# Patient Record
Sex: FEMALE | Race: WHITE | NOT HISPANIC OR LATINO | Employment: FULL TIME | URBAN - METROPOLITAN AREA
[De-identification: names, ages, dates, MRNs, and addresses within clinical notes are randomized per-mention and may not be internally consistent; named-entity substitution may affect disease eponyms.]

---

## 2021-11-23 ENCOUNTER — CLINICAL SUPPORT (OUTPATIENT)
Dept: INTERNAL MEDICINE | Facility: CLINIC | Age: 41
End: 2021-11-23
Payer: COMMERCIAL

## 2021-11-23 VITALS
DIASTOLIC BLOOD PRESSURE: 70 MMHG | WEIGHT: 250 LBS | BODY MASS INDEX: 41.65 KG/M2 | HEIGHT: 65 IN | OXYGEN SATURATION: 95 % | SYSTOLIC BLOOD PRESSURE: 131 MMHG | HEART RATE: 90 BPM

## 2021-11-23 DIAGNOSIS — Z02.89 ENCOUNTER FOR OCCUPATIONAL PHYSICAL EXAMINATION: Primary | ICD-10-CM

## 2021-11-23 PROCEDURE — 80305 PR NON-DOT DRUG SCREENS: ICD-10-PCS | Mod: ,,, | Performed by: NURSE PRACTITIONER

## 2021-11-23 PROCEDURE — 99499 PR PHYSICAL - BASIC NON DOT/CDL: ICD-10-PCS | Mod: ,,, | Performed by: NURSE PRACTITIONER

## 2021-11-23 PROCEDURE — 99499 UNLISTED E&M SERVICE: CPT | Mod: ,,, | Performed by: NURSE PRACTITIONER

## 2021-11-23 PROCEDURE — 80305 DRUG TEST PRSMV DIR OPT OBS: CPT | Mod: ,,, | Performed by: NURSE PRACTITIONER

## 2021-11-23 RX ORDER — IBUPROFEN 200 MG
200 TABLET ORAL EVERY 6 HOURS PRN
COMMUNITY

## 2022-11-21 ENCOUNTER — TELEPHONE (OUTPATIENT)
Dept: LAB | Facility: CLINIC | Age: 42
End: 2022-11-21
Payer: COMMERCIAL

## 2022-11-21 NOTE — TELEPHONE ENCOUNTER
----- Message from Malini Lawrence sent at 11/21/2022 10:12 AM CST -----  Regarding: schedule  Contact: [phone]  Request Appt      Appt Type: Annual   Call Back Number:[phone]  Additional Information:

## 2022-12-12 ENCOUNTER — OFFICE VISIT (OUTPATIENT)
Dept: FAMILY MEDICINE | Facility: CLINIC | Age: 42
End: 2022-12-12
Payer: COMMERCIAL

## 2022-12-12 VITALS
SYSTOLIC BLOOD PRESSURE: 110 MMHG | DIASTOLIC BLOOD PRESSURE: 68 MMHG | OXYGEN SATURATION: 98 % | BODY MASS INDEX: 48.46 KG/M2 | HEIGHT: 65 IN | HEART RATE: 82 BPM | WEIGHT: 290.88 LBS | TEMPERATURE: 99 F

## 2022-12-12 DIAGNOSIS — Z13.29 SCREENING FOR THYROID DISORDER: ICD-10-CM

## 2022-12-12 DIAGNOSIS — R68.82 DECREASED LIBIDO: ICD-10-CM

## 2022-12-12 DIAGNOSIS — Z80.8 FAMILY HISTORY OF THYROID CANCER: ICD-10-CM

## 2022-12-12 DIAGNOSIS — Z80.3 FAMILY HISTORY OF BREAST CANCER IN FIRST DEGREE RELATIVE: ICD-10-CM

## 2022-12-12 DIAGNOSIS — Z13.220 ENCOUNTER FOR LIPID SCREENING FOR CARDIOVASCULAR DISEASE: ICD-10-CM

## 2022-12-12 DIAGNOSIS — E66.01 CLASS 3 SEVERE OBESITY DUE TO EXCESS CALORIES WITH BODY MASS INDEX (BMI) OF 45.0 TO 49.9 IN ADULT, UNSPECIFIED WHETHER SERIOUS COMORBIDITY PRESENT: ICD-10-CM

## 2022-12-12 DIAGNOSIS — Z13.1 ENCOUNTER FOR SCREENING EXAMINATION FOR IMPAIRED GLUCOSE REGULATION AND DIABETES MELLITUS: ICD-10-CM

## 2022-12-12 DIAGNOSIS — Z87.442 PERSONAL HISTORY OF KIDNEY STONES: ICD-10-CM

## 2022-12-12 DIAGNOSIS — Z13.89 SCREENING FOR BLOOD OR PROTEIN IN URINE: ICD-10-CM

## 2022-12-12 DIAGNOSIS — Z12.31 ENCOUNTER FOR SCREENING MAMMOGRAM FOR MALIGNANT NEOPLASM OF BREAST: ICD-10-CM

## 2022-12-12 DIAGNOSIS — Z86.16 HISTORY OF COVID-19: ICD-10-CM

## 2022-12-12 DIAGNOSIS — Z13.6 ENCOUNTER FOR LIPID SCREENING FOR CARDIOVASCULAR DISEASE: ICD-10-CM

## 2022-12-12 DIAGNOSIS — R53.83 FATIGUE, UNSPECIFIED TYPE: Primary | ICD-10-CM

## 2022-12-12 PROCEDURE — 3044F HG A1C LEVEL LT 7.0%: CPT | Mod: S$GLB,,, | Performed by: FAMILY MEDICINE

## 2022-12-12 PROCEDURE — 99999 PR PBB SHADOW E&M-EST. PATIENT-LVL III: CPT | Mod: PBBFAC,,, | Performed by: FAMILY MEDICINE

## 2022-12-12 PROCEDURE — 99214 PR OFFICE/OUTPT VISIT, EST, LEVL IV, 30-39 MIN: ICD-10-PCS | Mod: S$GLB,,, | Performed by: FAMILY MEDICINE

## 2022-12-12 PROCEDURE — 1159F PR MEDICATION LIST DOCUMENTED IN MEDICAL RECORD: ICD-10-PCS | Mod: S$GLB,,, | Performed by: FAMILY MEDICINE

## 2022-12-12 PROCEDURE — 3078F DIAST BP <80 MM HG: CPT | Mod: S$GLB,,, | Performed by: FAMILY MEDICINE

## 2022-12-12 PROCEDURE — 3008F BODY MASS INDEX DOCD: CPT | Mod: S$GLB,,, | Performed by: FAMILY MEDICINE

## 2022-12-12 PROCEDURE — 99999 PR PBB SHADOW E&M-EST. PATIENT-LVL III: ICD-10-PCS | Mod: PBBFAC,,, | Performed by: FAMILY MEDICINE

## 2022-12-12 PROCEDURE — 3074F SYST BP LT 130 MM HG: CPT | Mod: S$GLB,,, | Performed by: FAMILY MEDICINE

## 2022-12-12 PROCEDURE — 3074F PR MOST RECENT SYSTOLIC BLOOD PRESSURE < 130 MM HG: ICD-10-PCS | Mod: S$GLB,,, | Performed by: FAMILY MEDICINE

## 2022-12-12 PROCEDURE — 1160F RVW MEDS BY RX/DR IN RCRD: CPT | Mod: S$GLB,,, | Performed by: FAMILY MEDICINE

## 2022-12-12 PROCEDURE — 1160F PR REVIEW ALL MEDS BY PRESCRIBER/CLIN PHARMACIST DOCUMENTED: ICD-10-PCS | Mod: S$GLB,,, | Performed by: FAMILY MEDICINE

## 2022-12-12 PROCEDURE — 3078F PR MOST RECENT DIASTOLIC BLOOD PRESSURE < 80 MM HG: ICD-10-PCS | Mod: S$GLB,,, | Performed by: FAMILY MEDICINE

## 2022-12-12 PROCEDURE — 99214 OFFICE O/P EST MOD 30 MIN: CPT | Mod: S$GLB,,, | Performed by: FAMILY MEDICINE

## 2022-12-12 PROCEDURE — 3044F PR MOST RECENT HEMOGLOBIN A1C LEVEL <7.0%: ICD-10-PCS | Mod: S$GLB,,, | Performed by: FAMILY MEDICINE

## 2022-12-12 PROCEDURE — 3008F PR BODY MASS INDEX (BMI) DOCUMENTED: ICD-10-PCS | Mod: S$GLB,,, | Performed by: FAMILY MEDICINE

## 2022-12-12 PROCEDURE — 1159F MED LIST DOCD IN RCRD: CPT | Mod: S$GLB,,, | Performed by: FAMILY MEDICINE

## 2022-12-12 NOTE — Clinical Note
Can you schedule her for follow up of labs, fatigue? Book within the first couple of weeks Neptali if possible

## 2022-12-12 NOTE — PROGRESS NOTES
"    Subjective:       Patient ID: Dyan Wiseman is a 42 y.o. female.    Chief Complaint: Establish Care    43yo WF here today to establish care.    No pertinent PMHx, but concerned about hormones.    Decreased libido.    Itchy everywhere, espec ears and vaginal area. States she does not have any vaginal discharge, but has a different odor. States she uses topical Vagisil creams and wipes to help with feminine itching, also uses Monistat chafe relief gel in thigh and crease area.    States she has developed mouth ulcers--dentist suggested to have labs drawn to evaluate. States her mouth does not feel dry, but she gets frequent canker sores.    Trouble losing weight, states she has been on diet pills since age 13 and on and off diets all her life. States she got down to 160lbs around age 20 when she was caring for her dying father.    No change in bowel habits. States she does not have a bowel movement daily. Does not drink much water. Has hx of kidney stones (9 of them around niru and senior years of high school) with lithotripsy.      Depression Patient Health Questionnaire 12/12/2022   Over the last two weeks how often have you been bothered by little interest or pleasure in doing things Not at all   Over the last two weeks how often have you been bothered by feeling down, depressed or hopeless Not at all   PHQ-2 Total Score 0     Review of Systems   All other systems reviewed and are negative.      Past Medical History:   Diagnosis Date    History of COVID-19     7/20, 5/21    Personal history of kidney stones     "all kinds of stones" total of 9 times she was seen in the ER for kidney stones     Past Surgical History:   Procedure Laterality Date    LITHOTRIPSY  1998    SURGICAL REMOVAL OF NODULE OF VOCAL CORD Bilateral 1986     Family History   Problem Relation Age of Onset    Hypertension Mother     Colon cancer Father     Breast cancer Sister 50    Thyroid cancer Sister 30    Coronary artery disease " "Maternal Grandmother         triple bypass       Review of patient's allergies indicates:   Allergen Reactions    Pcn [penicillins] Rash       Current Outpatient Medications:     ibuprofen (ADVIL,MOTRIN) 200 MG tablet, Take 200 mg by mouth every 6 (six) hours as needed for Pain., Disp: , Rfl:     cholecalciferol, vitamin D3, (DECARA) 1,250 mcg (50,000 unit) capsule, Take 1 capsule (50,000 Units total) by mouth twice a week. With a meal or with a spoonful of peanut butter for low vitamin D., Disp: 24 capsule, Rfl: 0    cyanocobalamin 1,000 mcg/mL injection, Inject into the muscle 1 mL daily for 1 week, then for 6 weeks, then every 2 weeks for 6 weeks, then as instructed by your doctor., Disp: 12 mL, Rfl: 3    Current Facility-Administered Medications:     cyanocobalamin injection 1,000 mcg, 1,000 mcg, Intramuscular, Weekly, Mary Morales MD      Objective:      /68 (BP Location: Right arm, Patient Position: Sitting, BP Method: Large (Manual))   Pulse 82   Temp 98.6 °F (37 °C) (Tympanic)   Ht 5' 4.5" (1.638 m)   Wt 131.9 kg (290 lb 14.4 oz)   SpO2 98%   BMI 49.16 kg/m²   Physical Exam  Vitals and nursing note reviewed.   Constitutional:       General: She is not in acute distress.     Appearance: Normal appearance. She is well-developed. She is obese. She is not ill-appearing, toxic-appearing or diaphoretic.   HENT:      Head: Normocephalic and atraumatic.      Right Ear: External ear normal.      Left Ear: External ear normal.      Nose: No congestion.      Mouth/Throat:      Mouth: Mucous membranes are moist.      Pharynx: Oropharynx is clear. No oropharyngeal exudate.   Eyes:      General: No scleral icterus.        Right eye: No discharge.         Left eye: No discharge.      Extraocular Movements: Extraocular movements intact.      Conjunctiva/sclera: Conjunctivae normal.      Pupils: Pupils are equal, round, and reactive to light.   Neck:      Thyroid: No thyromegaly.      Vascular: No carotid " bruit or JVD.      Trachea: No tracheal deviation.   Cardiovascular:      Rate and Rhythm: Normal rate and regular rhythm.      Pulses: Normal pulses.      Heart sounds: Normal heart sounds. No murmur heard.    No friction rub. No gallop.   Pulmonary:      Effort: Pulmonary effort is normal. No respiratory distress.      Breath sounds: Normal breath sounds. No wheezing, rhonchi or rales.   Abdominal:      General: Abdomen is flat. Bowel sounds are normal. There is no distension.      Palpations: Abdomen is soft.      Tenderness: There is no abdominal tenderness. There is no right CVA tenderness, left CVA tenderness, guarding or rebound.   Musculoskeletal:         General: Normal range of motion.      Cervical back: Normal range of motion and neck supple. No tenderness.      Right lower leg: No edema.      Left lower leg: No edema.   Lymphadenopathy:      Cervical: No cervical adenopathy.   Skin:     General: Skin is warm and dry.      Capillary Refill: Capillary refill takes less than 2 seconds.      Coloration: Skin is not jaundiced or pale.      Findings: No erythema or rash.   Neurological:      General: No focal deficit present.      Mental Status: She is alert and oriented to person, place, and time. Mental status is at baseline.      Motor: No weakness.      Coordination: Coordination normal.      Gait: Gait normal.   Psychiatric:         Mood and Affect: Mood normal.         Behavior: Behavior normal.         Thought Content: Thought content normal.         Judgment: Judgment normal.       Assessment:       1. Fatigue, unspecified type    2. Family history of breast cancer in first degree relative    3. Family history of thyroid cancer    4. Class 3 severe obesity due to excess calories with body mass index (BMI) of 45.0 to 49.9 in adult, unspecified whether serious comorbidity present    5. History of COVID-19    6. Personal history of kidney stones    7. Encounter for screening mammogram for malignant  neoplasm of breast    8. Encounter for lipid screening for cardiovascular disease    9. Encounter for screening examination for impaired glucose regulation and diabetes mellitus    10. Screening for thyroid disorder    11. Screening for blood or protein in urine    12. Decreased libido        Plan:       Fatigue, unspecified type  -     Vitamin B12; Future; Expected date: 12/12/2022  -     TSH; Future; Expected date: 12/12/2022  -     T4, Free; Future; Expected date: 12/12/2022    Family history of breast cancer in first degree relative  -     Mammo Digital Screening Bilat w/ Adolph; Future; Expected date: 12/12/2022    Family history of thyroid cancer  -     T3; Future; Expected date: 12/12/2022    Class 3 severe obesity due to excess calories with body mass index (BMI) of 45.0 to 49.9 in adult, unspecified whether serious comorbidity present  -     CBC Auto Differential; Future; Expected date: 12/12/2022  -     Comprehensive Metabolic Panel; Future; Expected date: 12/12/2022  -     Lipid Panel; Future; Expected date: 12/12/2022  -     Iron and TIBC; Future; Expected date: 12/12/2022  -     Insulin, Random; Future; Expected date: 12/12/2022  -     Ferritin; Future; Expected date: 12/12/2022  -     Vitamin D; Future; Expected date: 12/12/2022  -     Magnesium; Future; Expected date: 12/12/2022  -     DHEA-Sulfate; Future; Expected date: 12/12/2022  -     Estradiol; Future; Expected date: 12/12/2022  -     Estrogens, Total; Future; Expected date: 12/12/2022  -     Estrone; Future; Expected date: 12/12/2022  -     Follicle Stimulating Hormone; Future; Expected date: 12/12/2022  -     Luteinizing Hormone; Future; Expected date: 12/12/2022  -     Progesterone; Future; Expected date: 12/12/2022  -     Prolactin; Future; Expected date: 12/12/2022  -     Sex Hormone Binding Globulin; Future; Expected date: 12/12/2022  -     Testosterone, Free; Future; Expected date: 12/12/2022  -     Testosterone; Future; Expected date:  12/12/2022  -     Cortisol, free, serum; Future; Expected date: 12/12/2022    History of COVID-19    Personal history of kidney stones    Encounter for screening mammogram for malignant neoplasm of breast  -     Mammo Digital Screening Bilat w/ Adolph; Future; Expected date: 12/12/2022    Encounter for lipid screening for cardiovascular disease  -     Lipid Panel; Future; Expected date: 12/12/2022    Encounter for screening examination for impaired glucose regulation and diabetes mellitus  -     Comprehensive Metabolic Panel; Future; Expected date: 12/12/2022  -     Hemoglobin A1C; Future; Expected date: 12/12/2022    Screening for thyroid disorder  -     T3; Future; Expected date: 12/12/2022    Screening for blood or protein in urine  -     Urinalysis, Reflex to Urine Culture Urine, Clean Catch; Future    Decreased libido            Previous records in Epic were reviewed, including the last 3 months of encounters, imaging, laboratory, and pathology reports.    Strict return precautions reviewed and patient verbalized understanding. Risks, benefits, and alternatives to the plan were reviewed in detail and all questions answered to the patient's satisfaction. Patient agrees to return to clinic or ER if symptoms worsen. 40 minutes total were spent on today's visit, not limited to but including time based on counseling and coordination of care.    Patient instructed that best way to communicate with my office staff is for patient to get on the [facility] epic patient portal to expedite communication and communication issues that may occur.  Patient was given instructions on how to get on the portal.  I encouraged patient to obtain portal access as well.  Ultimately it is up to the patient to obtain access.  Patient voiced understanding.    This note was created using [employer] voice recognition software that occasionally may misinterpret phrases or words.    Follow up in about 3 weeks (around 1/2/2023) for lab follow up and  fatigue.

## 2022-12-14 ENCOUNTER — LAB VISIT (OUTPATIENT)
Dept: LAB | Facility: HOSPITAL | Age: 42
End: 2022-12-14
Attending: FAMILY MEDICINE
Payer: COMMERCIAL

## 2022-12-14 DIAGNOSIS — R53.83 FATIGUE, UNSPECIFIED TYPE: ICD-10-CM

## 2022-12-14 DIAGNOSIS — Z80.8 FAMILY HISTORY OF THYROID CANCER: ICD-10-CM

## 2022-12-14 DIAGNOSIS — E66.01 CLASS 3 SEVERE OBESITY DUE TO EXCESS CALORIES WITH BODY MASS INDEX (BMI) OF 45.0 TO 49.9 IN ADULT, UNSPECIFIED WHETHER SERIOUS COMORBIDITY PRESENT: ICD-10-CM

## 2022-12-14 DIAGNOSIS — Z13.29 SCREENING FOR THYROID DISORDER: ICD-10-CM

## 2022-12-14 DIAGNOSIS — Z13.1 ENCOUNTER FOR SCREENING EXAMINATION FOR IMPAIRED GLUCOSE REGULATION AND DIABETES MELLITUS: ICD-10-CM

## 2022-12-14 DIAGNOSIS — Z13.220 ENCOUNTER FOR LIPID SCREENING FOR CARDIOVASCULAR DISEASE: ICD-10-CM

## 2022-12-14 DIAGNOSIS — Z13.6 ENCOUNTER FOR LIPID SCREENING FOR CARDIOVASCULAR DISEASE: ICD-10-CM

## 2022-12-14 LAB
25(OH)D3+25(OH)D2 SERPL-MCNC: 10 NG/ML (ref 30–96)
ALBUMIN SERPL BCP-MCNC: 3.2 G/DL (ref 3.5–5.2)
ALP SERPL-CCNC: 99 U/L (ref 55–135)
ALT SERPL W/O P-5'-P-CCNC: 13 U/L (ref 10–44)
ANION GAP SERPL CALC-SCNC: 8 MMOL/L (ref 8–16)
AST SERPL-CCNC: 11 U/L (ref 10–40)
BASOPHILS # BLD AUTO: 0.04 K/UL (ref 0–0.2)
BASOPHILS NFR BLD: 0.6 % (ref 0–1.9)
BILIRUB SERPL-MCNC: 0.4 MG/DL (ref 0.1–1)
BUN SERPL-MCNC: 11 MG/DL (ref 6–20)
CALCIUM SERPL-MCNC: 8.7 MG/DL (ref 8.7–10.5)
CHLORIDE SERPL-SCNC: 106 MMOL/L (ref 95–110)
CHOLEST SERPL-MCNC: 187 MG/DL (ref 120–199)
CHOLEST/HDLC SERPL: 3.9 {RATIO} (ref 2–5)
CO2 SERPL-SCNC: 24 MMOL/L (ref 23–29)
CREAT SERPL-MCNC: 0.8 MG/DL (ref 0.5–1.4)
DHEA-S SERPL-MCNC: 182.3 UG/DL (ref 74.8–410.2)
DIFFERENTIAL METHOD: NORMAL
EOSINOPHIL # BLD AUTO: 0.2 K/UL (ref 0–0.5)
EOSINOPHIL NFR BLD: 2.7 % (ref 0–8)
ERYTHROCYTE [DISTWIDTH] IN BLOOD BY AUTOMATED COUNT: 13.6 % (ref 11.5–14.5)
EST. GFR  (NO RACE VARIABLE): >60 ML/MIN/1.73 M^2
ESTIMATED AVG GLUCOSE: 100 MG/DL (ref 68–131)
ESTRADIOL SERPL-MCNC: 31 PG/ML
FERRITIN SERPL-MCNC: 22 NG/ML (ref 20–300)
FSH SERPL-ACNC: 8.27 MIU/ML
GLUCOSE SERPL-MCNC: 109 MG/DL (ref 70–110)
HBA1C MFR BLD: 5.1 % (ref 4–5.6)
HCT VFR BLD AUTO: 39.5 % (ref 37–48.5)
HDLC SERPL-MCNC: 48 MG/DL (ref 40–75)
HDLC SERPL: 25.7 % (ref 20–50)
HGB BLD-MCNC: 12.8 G/DL (ref 12–16)
IMM GRANULOCYTES # BLD AUTO: 0.03 K/UL (ref 0–0.04)
IMM GRANULOCYTES NFR BLD AUTO: 0.5 % (ref 0–0.5)
INSULIN COLLECTION INTERVAL: 0
INSULIN SERPL-ACNC: 18.5 UU/ML
IRON SERPL-MCNC: 44 UG/DL (ref 30–160)
LDLC SERPL CALC-MCNC: 128.4 MG/DL (ref 63–159)
LH SERPL-ACNC: 3.1 MIU/ML
LYMPHOCYTES # BLD AUTO: 1.7 K/UL (ref 1–4.8)
LYMPHOCYTES NFR BLD: 27.2 % (ref 18–48)
MAGNESIUM SERPL-MCNC: 2 MG/DL (ref 1.6–2.6)
MCH RBC QN AUTO: 27.4 PG (ref 27–31)
MCHC RBC AUTO-ENTMCNC: 32.4 G/DL (ref 32–36)
MCV RBC AUTO: 85 FL (ref 82–98)
MONOCYTES # BLD AUTO: 0.5 K/UL (ref 0.3–1)
MONOCYTES NFR BLD: 7.2 % (ref 4–15)
NEUTROPHILS # BLD AUTO: 3.9 K/UL (ref 1.8–7.7)
NEUTROPHILS NFR BLD: 61.8 % (ref 38–73)
NONHDLC SERPL-MCNC: 139 MG/DL
NRBC BLD-RTO: 0 /100 WBC
PLATELET # BLD AUTO: 329 K/UL (ref 150–450)
PMV BLD AUTO: 10.1 FL (ref 9.2–12.9)
POTASSIUM SERPL-SCNC: 4.1 MMOL/L (ref 3.5–5.1)
PROGEST SERPL-MCNC: 0.2 NG/ML
PROLACTIN SERPL IA-MCNC: 32.1 NG/ML (ref 5.2–26.5)
PROT SERPL-MCNC: 7.2 G/DL (ref 6–8.4)
RBC # BLD AUTO: 4.67 M/UL (ref 4–5.4)
SATURATED IRON: 9 % (ref 20–50)
SODIUM SERPL-SCNC: 138 MMOL/L (ref 136–145)
T3 SERPL-MCNC: 94 NG/DL (ref 60–180)
T4 FREE SERPL-MCNC: 0.92 NG/DL (ref 0.71–1.51)
TESTOST SERPL-MCNC: 23 NG/DL (ref 5–73)
TOTAL IRON BINDING CAPACITY: 484 UG/DL (ref 250–450)
TRANSFERRIN SERPL-MCNC: 327 MG/DL (ref 200–375)
TRIGL SERPL-MCNC: 53 MG/DL (ref 30–150)
TSH SERPL DL<=0.005 MIU/L-ACNC: 3.3 UIU/ML (ref 0.4–4)
VIT B12 SERPL-MCNC: 165 PG/ML (ref 210–950)
WBC # BLD AUTO: 6.37 K/UL (ref 3.9–12.7)

## 2022-12-14 PROCEDURE — 82670 ASSAY OF TOTAL ESTRADIOL: CPT | Performed by: FAMILY MEDICINE

## 2022-12-14 PROCEDURE — 82679 ASSAY OF ESTRONE: CPT | Performed by: FAMILY MEDICINE

## 2022-12-14 PROCEDURE — 84466 ASSAY OF TRANSFERRIN: CPT | Performed by: FAMILY MEDICINE

## 2022-12-14 PROCEDURE — 82607 VITAMIN B-12: CPT | Performed by: FAMILY MEDICINE

## 2022-12-14 PROCEDURE — 84144 ASSAY OF PROGESTERONE: CPT | Performed by: FAMILY MEDICINE

## 2022-12-14 PROCEDURE — 82728 ASSAY OF FERRITIN: CPT | Performed by: FAMILY MEDICINE

## 2022-12-14 PROCEDURE — 82672 ASSAY OF ESTROGEN: CPT | Performed by: FAMILY MEDICINE

## 2022-12-14 PROCEDURE — 83001 ASSAY OF GONADOTROPIN (FSH): CPT | Performed by: FAMILY MEDICINE

## 2022-12-14 PROCEDURE — 83002 ASSAY OF GONADOTROPIN (LH): CPT | Performed by: FAMILY MEDICINE

## 2022-12-14 PROCEDURE — 84439 ASSAY OF FREE THYROXINE: CPT | Performed by: FAMILY MEDICINE

## 2022-12-14 PROCEDURE — 80061 LIPID PANEL: CPT | Performed by: FAMILY MEDICINE

## 2022-12-14 PROCEDURE — 84270 ASSAY OF SEX HORMONE GLOBUL: CPT | Performed by: FAMILY MEDICINE

## 2022-12-14 PROCEDURE — 84146 ASSAY OF PROLACTIN: CPT | Performed by: FAMILY MEDICINE

## 2022-12-14 PROCEDURE — 83735 ASSAY OF MAGNESIUM: CPT | Performed by: FAMILY MEDICINE

## 2022-12-14 PROCEDURE — 83525 ASSAY OF INSULIN: CPT | Performed by: FAMILY MEDICINE

## 2022-12-14 PROCEDURE — 83036 HEMOGLOBIN GLYCOSYLATED A1C: CPT | Performed by: FAMILY MEDICINE

## 2022-12-14 PROCEDURE — 80053 COMPREHEN METABOLIC PANEL: CPT | Performed by: FAMILY MEDICINE

## 2022-12-14 PROCEDURE — 85025 COMPLETE CBC W/AUTO DIFF WBC: CPT | Performed by: FAMILY MEDICINE

## 2022-12-14 PROCEDURE — 82530 CORTISOL FREE: CPT | Performed by: FAMILY MEDICINE

## 2022-12-14 PROCEDURE — 36415 COLL VENOUS BLD VENIPUNCTURE: CPT | Performed by: FAMILY MEDICINE

## 2022-12-14 PROCEDURE — 84403 ASSAY OF TOTAL TESTOSTERONE: CPT | Performed by: FAMILY MEDICINE

## 2022-12-14 PROCEDURE — 82306 VITAMIN D 25 HYDROXY: CPT | Performed by: FAMILY MEDICINE

## 2022-12-14 PROCEDURE — 82627 DEHYDROEPIANDROSTERONE: CPT | Performed by: FAMILY MEDICINE

## 2022-12-14 PROCEDURE — 84443 ASSAY THYROID STIM HORMONE: CPT | Performed by: FAMILY MEDICINE

## 2022-12-14 PROCEDURE — 84402 ASSAY OF FREE TESTOSTERONE: CPT | Performed by: FAMILY MEDICINE

## 2022-12-14 PROCEDURE — 84480 ASSAY TRIIODOTHYRONINE (T3): CPT | Performed by: FAMILY MEDICINE

## 2022-12-17 LAB — ESTRONE SERPL-MCNC: 43 PG/ML

## 2022-12-19 LAB
ESTROGEN SERPL-MCNC: 109 PG/ML
SHBG SERPL-SCNC: 64 NMOL/L
TESTOST FREE SERPL-MCNC: <0.4 PG/ML

## 2022-12-20 ENCOUNTER — HOSPITAL ENCOUNTER (OUTPATIENT)
Dept: RADIOLOGY | Facility: HOSPITAL | Age: 42
Discharge: HOME | End: 2022-12-20
Attending: FAMILY MEDICINE
Payer: COMMERCIAL

## 2022-12-20 DIAGNOSIS — Z12.31 ENCOUNTER FOR SCREENING MAMMOGRAM FOR MALIGNANT NEOPLASM OF BREAST: ICD-10-CM

## 2022-12-20 DIAGNOSIS — Z80.3 FAMILY HISTORY OF BREAST CANCER IN FIRST DEGREE RELATIVE: ICD-10-CM

## 2022-12-20 PROCEDURE — 77067 SCR MAMMO BI INCL CAD: CPT | Mod: 26,,, | Performed by: RADIOLOGY

## 2022-12-20 PROCEDURE — 77067 MAMMO DIGITAL SCREENING BILAT WITH TOMO: ICD-10-PCS | Mod: 26,,, | Performed by: RADIOLOGY

## 2022-12-20 PROCEDURE — 77063 MAMMO DIGITAL SCREENING BILAT WITH TOMO: ICD-10-PCS | Mod: 26,,, | Performed by: RADIOLOGY

## 2022-12-20 PROCEDURE — 77067 SCR MAMMO BI INCL CAD: CPT | Mod: TC

## 2022-12-20 PROCEDURE — 77063 BREAST TOMOSYNTHESIS BI: CPT | Mod: TC

## 2022-12-20 PROCEDURE — 77063 BREAST TOMOSYNTHESIS BI: CPT | Mod: 26,,, | Performed by: RADIOLOGY

## 2022-12-21 DIAGNOSIS — E55.9 VITAMIN D DEFICIENCY: Primary | ICD-10-CM

## 2022-12-21 DIAGNOSIS — E53.8 B12 DEFICIENCY: ICD-10-CM

## 2022-12-21 RX ORDER — CYANOCOBALAMIN 1000 UG/ML
INJECTION, SOLUTION INTRAMUSCULAR; SUBCUTANEOUS
Qty: 12 ML | Refills: 3 | Status: SHIPPED | OUTPATIENT
Start: 2022-12-21

## 2022-12-21 RX ORDER — CYANOCOBALAMIN 1000 UG/ML
1000 INJECTION, SOLUTION INTRAMUSCULAR; SUBCUTANEOUS DAILY
Status: SHIPPED | OUTPATIENT
Start: 2022-12-21 | End: 2022-12-26

## 2022-12-21 RX ORDER — CYANOCOBALAMIN 1000 UG/ML
1000 INJECTION, SOLUTION INTRAMUSCULAR; SUBCUTANEOUS WEEKLY
Status: SHIPPED | OUTPATIENT
Start: 2022-12-22 | End: 2023-02-02

## 2022-12-21 RX ORDER — ASPIRIN 325 MG
50000 TABLET, DELAYED RELEASE (ENTERIC COATED) ORAL
Qty: 24 CAPSULE | Refills: 0 | Status: SHIPPED | OUTPATIENT
Start: 2022-12-22 | End: 2023-03-11 | Stop reason: SDUPTHER

## 2022-12-22 ENCOUNTER — TELEPHONE (OUTPATIENT)
Dept: FAMILY MEDICINE | Facility: CLINIC | Age: 42
End: 2022-12-22
Payer: COMMERCIAL

## 2022-12-22 LAB — CORTIS F SERPL-MCNC: 0.53 MCG/DL

## 2022-12-22 NOTE — TELEPHONE ENCOUNTER
Spoke with Leydi, to clarify instruction on administration of cyanocobalamin 1,000mcg/ml, Leydi verified and verbalized understanding.

## 2022-12-22 NOTE — TELEPHONE ENCOUNTER
----- Message from Mary Pedraza sent at 12/21/2022  4:08 PM CST -----  Contact: Leydi  Type:  Pharmacy Calling to Clarify an RX    Name of Caller: Leydi     Pharmacy Name: [facility]     Prescription Name: cyanocobalamin 1,000 mcg/mL injection      What do they need to clarify?: clarify directions     Best Call Back Number:  [phone]    Additional Information:

## 2022-12-29 PROBLEM — E66.01 CLASS 3 SEVERE OBESITY DUE TO EXCESS CALORIES WITH BODY MASS INDEX (BMI) OF 45.0 TO 49.9 IN ADULT: Status: ACTIVE | Noted: 2022-12-29

## 2022-12-29 PROBLEM — R68.82 DECREASED LIBIDO: Status: ACTIVE | Noted: 2022-12-29

## 2022-12-29 PROBLEM — Z80.8 FAMILY HISTORY OF THYROID CANCER: Status: ACTIVE | Noted: 2022-12-29

## 2022-12-29 PROBLEM — E66.813 CLASS 3 SEVERE OBESITY DUE TO EXCESS CALORIES WITH BODY MASS INDEX (BMI) OF 45.0 TO 49.9 IN ADULT: Status: ACTIVE | Noted: 2022-12-29

## 2022-12-29 PROBLEM — Z80.3 FAMILY HISTORY OF BREAST CANCER IN FIRST DEGREE RELATIVE: Status: ACTIVE | Noted: 2022-12-29

## 2022-12-29 PROBLEM — R53.83 FATIGUE: Status: ACTIVE | Noted: 2022-12-29

## 2022-12-30 ENCOUNTER — TELEPHONE (OUTPATIENT)
Dept: FAMILY MEDICINE | Facility: CLINIC | Age: 42
End: 2022-12-30
Payer: COMMERCIAL

## 2022-12-30 NOTE — TELEPHONE ENCOUNTER
Call placed to patient due to lab results and 's orders for scheduling Vit-B12 injections. Spoke with patient states currently receiving daily injections and have an appointment scheduled for Tuesday 1/3/2023 for follow-up.    ----- Message from Mary Morales MD sent at 12/21/2022  2:33 PM CST -----  Assist with scheduling B12 injections daily for 1 week then weekly for 6 weeks. You can instruct on how to self-administer if she would like. Orders for both have been placed.        I reviewed your labs.     Your vitamin D is very low and I have sent in a prescription for you to take twice weekly with a meal.    Your vitamin B12 is very low as well; I recommend b12 injections to help bring upu your lever more quickly, in addition to an over the counter B12 supplement: "[employer] methyl b12 liquid spray, use 2 sprays under the tongue daily and HOLD for 30 seconds. You can find on [employer] or "[employer] website.    Your iron saturation is low, and I recommend iron supplementation.    The cortisol is still pending. We should have those results for your January follow up.    The hormones are directly affected by the vitamin D and B12 levels.

## 2023-01-13 ENCOUNTER — OFFICE VISIT (OUTPATIENT)
Dept: FAMILY MEDICINE | Facility: CLINIC | Age: 43
End: 2023-01-13
Payer: COMMERCIAL

## 2023-01-13 VITALS
OXYGEN SATURATION: 96 % | HEIGHT: 65 IN | SYSTOLIC BLOOD PRESSURE: 118 MMHG | WEIGHT: 293 LBS | HEART RATE: 82 BPM | TEMPERATURE: 99 F | DIASTOLIC BLOOD PRESSURE: 62 MMHG | BODY MASS INDEX: 48.82 KG/M2

## 2023-01-13 DIAGNOSIS — K21.9 GASTROESOPHAGEAL REFLUX DISEASE, UNSPECIFIED WHETHER ESOPHAGITIS PRESENT: ICD-10-CM

## 2023-01-13 DIAGNOSIS — N93.9 ABNORMAL UTERINE BLEEDING: Primary | ICD-10-CM

## 2023-01-13 DIAGNOSIS — B37.0 THRUSH: ICD-10-CM

## 2023-01-13 DIAGNOSIS — R68.82 DECREASED LIBIDO: ICD-10-CM

## 2023-01-13 PROCEDURE — 99214 PR OFFICE/OUTPT VISIT, EST, LEVL IV, 30-39 MIN: ICD-10-PCS | Mod: S$GLB,,, | Performed by: FAMILY MEDICINE

## 2023-01-13 PROCEDURE — 1159F PR MEDICATION LIST DOCUMENTED IN MEDICAL RECORD: ICD-10-PCS | Mod: S$GLB,,, | Performed by: FAMILY MEDICINE

## 2023-01-13 PROCEDURE — 99214 OFFICE O/P EST MOD 30 MIN: CPT | Mod: S$GLB,,, | Performed by: FAMILY MEDICINE

## 2023-01-13 PROCEDURE — 3008F PR BODY MASS INDEX (BMI) DOCUMENTED: ICD-10-PCS | Mod: S$GLB,,, | Performed by: FAMILY MEDICINE

## 2023-01-13 PROCEDURE — 99999 PR PBB SHADOW E&M-EST. PATIENT-LVL IV: CPT | Mod: PBBFAC,,, | Performed by: FAMILY MEDICINE

## 2023-01-13 PROCEDURE — 99999 PR PBB SHADOW E&M-EST. PATIENT-LVL IV: ICD-10-PCS | Mod: PBBFAC,,, | Performed by: FAMILY MEDICINE

## 2023-01-13 PROCEDURE — 3008F BODY MASS INDEX DOCD: CPT | Mod: S$GLB,,, | Performed by: FAMILY MEDICINE

## 2023-01-13 PROCEDURE — 3074F PR MOST RECENT SYSTOLIC BLOOD PRESSURE < 130 MM HG: ICD-10-PCS | Mod: S$GLB,,, | Performed by: FAMILY MEDICINE

## 2023-01-13 PROCEDURE — 1160F RVW MEDS BY RX/DR IN RCRD: CPT | Mod: S$GLB,,, | Performed by: FAMILY MEDICINE

## 2023-01-13 PROCEDURE — 1160F PR REVIEW ALL MEDS BY PRESCRIBER/CLIN PHARMACIST DOCUMENTED: ICD-10-PCS | Mod: S$GLB,,, | Performed by: FAMILY MEDICINE

## 2023-01-13 PROCEDURE — 3078F PR MOST RECENT DIASTOLIC BLOOD PRESSURE < 80 MM HG: ICD-10-PCS | Mod: S$GLB,,, | Performed by: FAMILY MEDICINE

## 2023-01-13 PROCEDURE — 3074F SYST BP LT 130 MM HG: CPT | Mod: S$GLB,,, | Performed by: FAMILY MEDICINE

## 2023-01-13 PROCEDURE — 1159F MED LIST DOCD IN RCRD: CPT | Mod: S$GLB,,, | Performed by: FAMILY MEDICINE

## 2023-01-13 PROCEDURE — 3078F DIAST BP <80 MM HG: CPT | Mod: S$GLB,,, | Performed by: FAMILY MEDICINE

## 2023-01-13 RX ORDER — FLUCONAZOLE 200 MG/1
200 TABLET ORAL DAILY
Qty: 15 TABLET | Refills: 0 | Status: SHIPPED | OUTPATIENT
Start: 2023-01-13 | End: 2023-01-27

## 2023-01-13 RX ORDER — ESOMEPRAZOLE MAGNESIUM 40 MG/1
CAPSULE, DELAYED RELEASE ORAL
Qty: 45 CAPSULE | Refills: 0 | Status: SHIPPED | OUTPATIENT
Start: 2023-01-13 | End: 2023-05-03

## 2023-01-13 NOTE — PROGRESS NOTES
"  Subjective:       Patient ID: Dyan Wiseman is a 42 y.o. female.    Chief Complaint: Follow-up    Follow up B12 deficiency, iron def anemia, vit D def, and irregular and heavy menses.    Currently on B12 injection weekly. Appetite has grown       Depression Patient Health Questionnaire 1/13/2023 12/12/2022   Over the last two weeks how often have you been bothered by little interest or pleasure in doing things Not at all Not at all   Over the last two weeks how often have you been bothered by feeling down, depressed or hopeless Not at all Not at all   PHQ-2 Total Score 0 0     Review of Systems   All other systems reviewed and are negative.      Past Medical History:   Diagnosis Date    History of COVID-19     7/20, 5/21    Personal history of kidney stones     "all kinds of stones" total of 9 times she was seen in the ER for kidney stones     Past Surgical History:   Procedure Laterality Date    LITHOTRIPSY  1998    SURGICAL REMOVAL OF NODULE OF VOCAL CORD Bilateral 1986     Family History   Problem Relation Age of Onset    Hypertension Mother     Colon cancer Father     Breast cancer Sister 50    Thyroid cancer Sister 30    Coronary artery disease Maternal Grandmother         triple bypass       Review of patient's allergies indicates:   Allergen Reactions    Pcn [penicillins] Rash       Current Outpatient Medications:     cholecalciferol, vitamin D3, (DECARA) 1,250 mcg (50,000 unit) capsule, Take 1 capsule (50,000 Units total) by mouth twice a week. With a meal or with a spoonful of peanut butter for low vitamin D., Disp: 24 capsule, Rfl: 0    cyanocobalamin 1,000 mcg/mL injection, Inject into the muscle 1 mL daily for 1 week, then for 6 weeks, then every 2 weeks for 6 weeks, then as instructed by your doctor., Disp: 12 mL, Rfl: 3    ibuprofen (ADVIL,MOTRIN) 200 MG tablet, Take 200 mg by mouth every 6 (six) hours as needed for Pain., Disp: , Rfl:     esomeprazole (NEXIUM) 40 MG capsule, Take 1 capsule BID AC " "meals for 2 weeks then once daily before breakfast., Disp: 45 capsule, Rfl: 0    fluconazole (DIFLUCAN) 200 MG Tab, Take 1 tablet (200 mg total) by mouth once daily. Start with a loading dose of 400mg on day #1. for 14 days, Disp: 15 tablet, Rfl: 0    Current Facility-Administered Medications:     cyanocobalamin injection 1,000 mcg, 1,000 mcg, Intramuscular, Weekly, Mary H. MD Andrew      Objective:      /62 (BP Location: Left arm, Patient Position: Sitting, BP Method: Medium (Manual)) Comment (BP Location): Forearm  Pulse 82   Temp 98.6 °F (37 °C) (Tympanic)   Ht 5' 4.5" (1.638 m)   Wt 134.5 kg (296 lb 10.1 oz)   SpO2 96%   BMI 50.13 kg/m²   Physical Exam  Vitals and nursing note reviewed.   Constitutional:       Appearance: Normal appearance. She is obese. She is not ill-appearing or toxic-appearing.   HENT:      Mouth/Throat:      Mouth: Mucous membranes are moist.      Pharynx: Posterior oropharyngeal erythema (posterior OP slightly irritatied/inflammed) present. No oropharyngeal exudate.   Cardiovascular:      Rate and Rhythm: Normal rate and regular rhythm.      Pulses: Normal pulses.      Heart sounds: Normal heart sounds. No murmur heard.  Pulmonary:      Effort: Pulmonary effort is normal.      Breath sounds: Normal breath sounds. No wheezing.   Musculoskeletal:      Cervical back: Neck supple. Tenderness (L submandibular LAD and R submandib LAD) present.   Lymphadenopathy:      Cervical: No cervical adenopathy.   Neurological:      Mental Status: She is alert.   Psychiatric:         Mood and Affect: Mood normal.         Behavior: Behavior normal.         Thought Content: Thought content normal.         Judgment: Judgment normal.       Assessment:       1. Abnormal uterine bleeding    2. Thrush    3. Decreased libido    4. Gastroesophageal reflux disease, unspecified whether esophagitis present        Plan:       Abnormal uterine bleeding  -     US Pelvis Complete Non OB; Future; Expected " date: 01/13/2023    Thrush  -     fluconazole (DIFLUCAN) 200 MG Tab; Take 1 tablet (200 mg total) by mouth once daily. Start with a loading dose of 400mg on day #1. for 14 days  Dispense: 15 tablet; Refill: 0    Decreased libido    Gastroesophageal reflux disease, unspecified whether esophagitis present  -     esomeprazole (NEXIUM) 40 MG capsule; Take 1 capsule BID AC meals for 2 weeks then once daily before breakfast.  Dispense: 45 capsule; Refill: 0    Continue methyl B12 spray daily  Can dec B12 injections to every other week for 6 weeks  Repeat B12 level in 12 weeks  Repeat Vit D level 12 weeks  Repeat Prolactin in 12 weeks  Pelvic US to evaluate menometrorrhagia  Start iron supplementation: I recommend [employer] for iron (and B12), you can find on [employer] or [employer] Website. This is a different type of iron than is typically found in multivitamins or in prescription iron, and it is easier for your gut to absorb.          Previous records in Epic were reviewed, including the last 3 months of encounters, imaging, laboratory, and pathology reports.    Strict return precautions reviewed and patient verbalized understanding. Risks, benefits, and alternatives to the plan were reviewed in detail and all questions answered to the patient's satisfaction. Patient agrees to return to clinic or ER if symptoms worsen. 30 minutes total were spent on today's visit, not limited to but including time based on counseling and coordination of care.    Patient instructed that best way to communicate with my office staff is for patient to get on the [facility] epic patient portal to expedite communication and communication issues that may occur.  Patient was given instructions on how to get on the portal.  I encouraged patient to obtain portal access as well.  Ultimately it is up to the patient to obtain access.  Patient voiced understanding.    This note was created using [employer] voice recognition software that  occasionally may misinterpret phrases or words.    Follow up in about 4 weeks (around 2/10/2023) for follow US, LYLA fischer, klaudia and LOU

## 2023-01-17 ENCOUNTER — PATIENT MESSAGE (OUTPATIENT)
Dept: ADMINISTRATIVE | Facility: HOSPITAL | Age: 43
End: 2023-01-17
Payer: COMMERCIAL

## 2023-01-22 PROBLEM — N93.9 ABNORMAL UTERINE BLEEDING: Status: ACTIVE | Noted: 2023-01-22

## 2023-01-22 PROBLEM — K21.9 GASTROESOPHAGEAL REFLUX DISEASE: Status: ACTIVE | Noted: 2023-01-22

## 2023-04-11 ENCOUNTER — PATIENT MESSAGE (OUTPATIENT)
Dept: ADMINISTRATIVE | Facility: HOSPITAL | Age: 43
End: 2023-04-11
Payer: COMMERCIAL

## 2023-05-03 ENCOUNTER — LAB VISIT (OUTPATIENT)
Dept: LAB | Facility: HOSPITAL | Age: 43
End: 2023-05-03
Payer: COMMERCIAL

## 2023-05-03 ENCOUNTER — OFFICE VISIT (OUTPATIENT)
Dept: FAMILY MEDICINE | Facility: CLINIC | Age: 43
End: 2023-05-03
Payer: COMMERCIAL

## 2023-05-03 ENCOUNTER — CLINICAL SUPPORT (OUTPATIENT)
Dept: FAMILY MEDICINE | Facility: CLINIC | Age: 43
End: 2023-05-03
Payer: COMMERCIAL

## 2023-05-03 VITALS
BODY MASS INDEX: 48.82 KG/M2 | SYSTOLIC BLOOD PRESSURE: 118 MMHG | DIASTOLIC BLOOD PRESSURE: 70 MMHG | OXYGEN SATURATION: 100 % | HEART RATE: 73 BPM | HEIGHT: 65 IN | WEIGHT: 293 LBS

## 2023-05-03 DIAGNOSIS — R68.82 DECREASED LIBIDO: ICD-10-CM

## 2023-05-03 DIAGNOSIS — D50.9 IRON DEFICIENCY ANEMIA, UNSPECIFIED IRON DEFICIENCY ANEMIA TYPE: ICD-10-CM

## 2023-05-03 DIAGNOSIS — E53.8 LOW SERUM VITAMIN B12: ICD-10-CM

## 2023-05-03 DIAGNOSIS — E55.9 VITAMIN D DEFICIENCY: ICD-10-CM

## 2023-05-03 DIAGNOSIS — R53.83 FATIGUE, UNSPECIFIED TYPE: Primary | ICD-10-CM

## 2023-05-03 DIAGNOSIS — R79.89 INCREASED PROLACTIN LEVEL: ICD-10-CM

## 2023-05-03 DIAGNOSIS — E66.01 CLASS 3 SEVERE OBESITY DUE TO EXCESS CALORIES WITH BODY MASS INDEX (BMI) OF 45.0 TO 49.9 IN ADULT, UNSPECIFIED WHETHER SERIOUS COMORBIDITY PRESENT: ICD-10-CM

## 2023-05-03 DIAGNOSIS — R42 DIZZINESS: ICD-10-CM

## 2023-05-03 DIAGNOSIS — R53.83 FATIGUE, UNSPECIFIED TYPE: ICD-10-CM

## 2023-05-03 LAB
IRON SERPL-MCNC: 66 UG/DL (ref 30–160)
SATURATED IRON: 15 % (ref 20–50)
T4 FREE SERPL-MCNC: 1.01 NG/DL (ref 0.71–1.51)
TOTAL IRON BINDING CAPACITY: 447 UG/DL (ref 250–450)
TRANSFERRIN SERPL-MCNC: 302 MG/DL (ref 200–375)
TSH SERPL DL<=0.005 MIU/L-ACNC: 2.53 UIU/ML (ref 0.4–4)

## 2023-05-03 PROCEDURE — 84466 ASSAY OF TRANSFERRIN: CPT | Performed by: FAMILY MEDICINE

## 2023-05-03 PROCEDURE — 3074F SYST BP LT 130 MM HG: CPT | Mod: S$GLB,,, | Performed by: FAMILY MEDICINE

## 2023-05-03 PROCEDURE — 84146 ASSAY OF PROLACTIN: CPT | Performed by: FAMILY MEDICINE

## 2023-05-03 PROCEDURE — 99999 PR PBB SHADOW E&M-EST. PATIENT-LVL III: ICD-10-PCS | Mod: PBBFAC,,, | Performed by: FAMILY MEDICINE

## 2023-05-03 PROCEDURE — 3078F DIAST BP <80 MM HG: CPT | Mod: S$GLB,,, | Performed by: FAMILY MEDICINE

## 2023-05-03 PROCEDURE — 93005 EKG 12-LEAD: ICD-10-PCS | Mod: S$GLB,,, | Performed by: FAMILY MEDICINE

## 2023-05-03 PROCEDURE — 1159F PR MEDICATION LIST DOCUMENTED IN MEDICAL RECORD: ICD-10-PCS | Mod: S$GLB,,, | Performed by: FAMILY MEDICINE

## 2023-05-03 PROCEDURE — 99999 PR PBB SHADOW E&M-EST. PATIENT-LVL III: CPT | Mod: PBBFAC,,, | Performed by: FAMILY MEDICINE

## 2023-05-03 PROCEDURE — 3008F PR BODY MASS INDEX (BMI) DOCUMENTED: ICD-10-PCS | Mod: S$GLB,,, | Performed by: FAMILY MEDICINE

## 2023-05-03 PROCEDURE — 84443 ASSAY THYROID STIM HORMONE: CPT | Performed by: FAMILY MEDICINE

## 2023-05-03 PROCEDURE — 82306 VITAMIN D 25 HYDROXY: CPT | Performed by: FAMILY MEDICINE

## 2023-05-03 PROCEDURE — 84481 FREE ASSAY (FT-3): CPT | Performed by: FAMILY MEDICINE

## 2023-05-03 PROCEDURE — 1160F RVW MEDS BY RX/DR IN RCRD: CPT | Mod: S$GLB,,, | Performed by: FAMILY MEDICINE

## 2023-05-03 PROCEDURE — 3008F BODY MASS INDEX DOCD: CPT | Mod: S$GLB,,, | Performed by: FAMILY MEDICINE

## 2023-05-03 PROCEDURE — 84439 ASSAY OF FREE THYROXINE: CPT | Performed by: FAMILY MEDICINE

## 2023-05-03 PROCEDURE — 83970 ASSAY OF PARATHORMONE: CPT | Performed by: FAMILY MEDICINE

## 2023-05-03 PROCEDURE — 36415 COLL VENOUS BLD VENIPUNCTURE: CPT | Performed by: FAMILY MEDICINE

## 2023-05-03 PROCEDURE — 93005 ELECTROCARDIOGRAM TRACING: CPT | Mod: S$GLB,,, | Performed by: FAMILY MEDICINE

## 2023-05-03 PROCEDURE — 1159F MED LIST DOCD IN RCRD: CPT | Mod: S$GLB,,, | Performed by: FAMILY MEDICINE

## 2023-05-03 PROCEDURE — 1160F PR REVIEW ALL MEDS BY PRESCRIBER/CLIN PHARMACIST DOCUMENTED: ICD-10-PCS | Mod: S$GLB,,, | Performed by: FAMILY MEDICINE

## 2023-05-03 PROCEDURE — 3074F PR MOST RECENT SYSTOLIC BLOOD PRESSURE < 130 MM HG: ICD-10-PCS | Mod: S$GLB,,, | Performed by: FAMILY MEDICINE

## 2023-05-03 PROCEDURE — 82728 ASSAY OF FERRITIN: CPT | Performed by: FAMILY MEDICINE

## 2023-05-03 PROCEDURE — 82607 VITAMIN B-12: CPT | Performed by: FAMILY MEDICINE

## 2023-05-03 PROCEDURE — 99214 OFFICE O/P EST MOD 30 MIN: CPT | Mod: S$GLB,,, | Performed by: FAMILY MEDICINE

## 2023-05-03 PROCEDURE — 99214 PR OFFICE/OUTPT VISIT, EST, LEVL IV, 30-39 MIN: ICD-10-PCS | Mod: S$GLB,,, | Performed by: FAMILY MEDICINE

## 2023-05-03 PROCEDURE — 3078F PR MOST RECENT DIASTOLIC BLOOD PRESSURE < 80 MM HG: ICD-10-PCS | Mod: S$GLB,,, | Performed by: FAMILY MEDICINE

## 2023-05-03 PROCEDURE — 93010 EKG 12-LEAD: ICD-10-PCS | Mod: S$GLB,,, | Performed by: INTERNAL MEDICINE

## 2023-05-03 PROCEDURE — 93010 ELECTROCARDIOGRAM REPORT: CPT | Mod: S$GLB,,, | Performed by: INTERNAL MEDICINE

## 2023-05-03 RX ORDER — FERROUS GLUCONATE 324(38)MG
324 TABLET ORAL
COMMUNITY

## 2023-05-03 NOTE — PROGRESS NOTES
"  Subjective:       Patient ID: Dyan Wiseman is a 42 y.o. female.    Chief Complaint: Follow-up    B12m iron def and vit D def. Low libido--"it returned April 2 and lasted 4 days then has been gone since then."    Last b12 shot was 7 days ago, last Vit D Rx dose was last night and last iron supplement was last night.    Taking those supp as well as probiotic.    Dizziness when turning head, also when standing even for a few seconds but also when seated.    She has lost 2 pounds since her last visit.    She desires Rx assistance with weight loss.    Eats one meal a day, at night. Too busy to eat at work, typically sips on flavored caffeinated water at her desk.    Depression Patient Health Questionnaire 1/13/2023 12/12/2022   Over the last two weeks how often have you been bothered by little interest or pleasure in doing things Not at all Not at all   Over the last two weeks how often have you been bothered by feeling down, depressed or hopeless Not at all Not at all   PHQ-2 Total Score 0 0       Review of Systems   All other systems reviewed and are negative.      Past Medical History:   Diagnosis Date    History of COVID-19     7/20, 5/21    Personal history of kidney stones     "all kinds of stones" total of 9 times she was seen in the ER for kidney stones     Past Surgical History:   Procedure Laterality Date    LITHOTRIPSY  1998    SURGICAL REMOVAL OF NODULE OF VOCAL CORD Bilateral 1986     Family History   Problem Relation Age of Onset    Hypertension Mother     Colon cancer Father     Cancer Father         Colon    Breast cancer Sister 50    Cancer Sister         Breast    Thyroid cancer Sister 30    Cancer Sister         Thyroid    Coronary artery disease Maternal Grandmother         triple bypass    Heart disease Maternal Grandmother        Review of patient's allergies indicates:   Allergen Reactions    Pcn [penicillins] Rash       Current Outpatient Medications:     cholecalciferol, vitamin D3, (DECARA) " "1,250 mcg (50,000 unit) capsule, Take 1 capsule (50,000 Units total) by mouth twice a week. With a meal or with a spoonful of peanut butter for low vitamin D., Disp: 24 capsule, Rfl: 0    cyanocobalamin 1,000 mcg/mL injection, Inject into the muscle 1 mL daily for 1 week, then for 6 weeks, then every 2 weeks for 6 weeks, then as instructed by your doctor., Disp: 12 mL, Rfl: 3    ferrous gluconate (FERGON) 324 MG tablet, Take 324 mg by mouth daily with breakfast., Disp: , Rfl:     ibuprofen (ADVIL,MOTRIN) 200 MG tablet, Take 200 mg by mouth every 6 (six) hours as needed for Pain., Disp: , Rfl:     Lactobacillus rhamnosus GG (CULTURELLE) 10 billion cell capsule, Take 1 capsule by mouth once daily., Disp: , Rfl:     phentermine (LOMAIRA) 8 mg Tab, Take 1 tablet by mouth 3 (three) times daily as needed (appetite suppression)., Disp: 90 each, Rfl: 0      Objective:      /70 (BP Location: Right arm, Patient Position: Sitting, BP Method: Large (Manual))   Pulse 73   Ht 5' 4.5" (1.638 m)   Wt 133.7 kg (294 lb 13.8 oz)   SpO2 100%   BMI 49.83 kg/m²   Physical Exam  Vitals and nursing note reviewed.   Constitutional:       General: She is not in acute distress.     Appearance: Normal appearance. She is well-developed. She is obese. She is not ill-appearing, toxic-appearing or diaphoretic.   HENT:      Head: Normocephalic and atraumatic.      Right Ear: External ear normal.      Left Ear: External ear normal.   Eyes:      General: No scleral icterus.        Right eye: No discharge.         Left eye: No discharge.      Extraocular Movements: Extraocular movements intact.      Conjunctiva/sclera: Conjunctivae normal.      Pupils: Pupils are equal, round, and reactive to light.   Neck:      Thyroid: No thyromegaly.      Vascular: No JVD.      Trachea: No tracheal deviation.   Cardiovascular:      Rate and Rhythm: Normal rate and regular rhythm.      Pulses: Normal pulses.      Heart sounds: Normal heart sounds. No " murmur heard.  Pulmonary:      Effort: Pulmonary effort is normal. No respiratory distress.      Breath sounds: Normal breath sounds. No wheezing.   Abdominal:      General: There is no distension.   Musculoskeletal:      Cervical back: Neck supple.   Skin:     General: Skin is warm and dry.      Capillary Refill: Capillary refill takes less than 2 seconds.      Coloration: Skin is not jaundiced or pale.      Findings: No erythema or rash.   Neurological:      General: No focal deficit present.      Mental Status: She is alert and oriented to person, place, and time. Mental status is at baseline.      Motor: No weakness.      Coordination: Coordination normal.      Gait: Gait normal.   Psychiatric:         Mood and Affect: Mood normal.         Behavior: Behavior normal.         Thought Content: Thought content normal.         Judgment: Judgment normal.       Assessment:       1. Fatigue, unspecified type    2. Low serum vitamin B12    3. Vitamin D deficiency    4. Iron deficiency anemia, unspecified iron deficiency anemia type    5. Class 3 severe obesity due to excess calories with body mass index (BMI) of 45.0 to 49.9 in adult, unspecified whether serious comorbidity present    6. Decreased libido    7. Increased prolactin level    8. Dizziness        Plan:       Fatigue, unspecified type  -     T3, Free; Future; Expected date: 05/03/2023  -     T4, Free; Future; Expected date: 05/03/2023  -     TSH; Future; Expected date: 05/03/2023    Low serum vitamin B12  -     Vitamin B12; Future; Expected date: 05/03/2023    Vitamin D deficiency  -     Vitamin D; Future; Expected date: 05/03/2023    Iron deficiency anemia, unspecified iron deficiency anemia type  -     Ferritin; Future; Expected date: 05/03/2023  -     Iron and TIBC; Future; Expected date: 05/03/2023  -     Vitamin B12; Future; Expected date: 05/03/2023    Class 3 severe obesity due to excess calories with body mass index (BMI) of 45.0 to 49.9 in adult,  unspecified whether serious comorbidity present    Decreased libido  -     Vitamin B12; Future; Expected date: 05/03/2023    Increased prolactin level  -     PTH, Intact; Future; Expected date: 05/03/2023  -     Prolactin; Future; Expected date: 05/03/2023    Dizziness  -     SCHEDULED EKG 12-LEAD (to Muse); Future          Protein 60-80g per day min  Continue water, but be cautions of consuming caffeine with phentermine; she will let us know if she wants to proceed with Lomaira (phentermine 8mg TID) to help with weight loss  Low carb but not true keto  Ekg today and labs today--await review, then plan to start stimulant for appetite suppression pending results      Previous records in Epic were reviewed, including the last 3 months of encounters, imaging, laboratory, and pathology reports.    Strict return precautions reviewed and patient verbalized understanding. Risks, benefits, and alternatives to the plan were reviewed in detail and all questions answered to the patient's satisfaction. Patient agrees to return to clinic or ER if symptoms worsen. 30 minutes total were spent on today's visit, not limited to but including time based on counseling and coordination of care.    Patient instructed that best way to communicate with my office staff is for patient to get on the [facility] patient portal to expedite communication and communication issues that may occur.  Patient was given instructions on how to get on the portal.  I encouraged patient to obtain portal access as well.  Ultimately it is up to the patient to obtain access.  Patient voiced understanding.    This note was created using M*Modal voice recognition software that occasionally may misinterpret phrases or words.    Follow up in about 4 weeks (around 5/31/2023) for f/u weight check, lab review.

## 2023-05-04 LAB
25(OH)D3+25(OH)D2 SERPL-MCNC: 104 NG/ML (ref 30–96)
FERRITIN SERPL-MCNC: 35 NG/ML (ref 20–300)
PROLACTIN SERPL IA-MCNC: 20.6 NG/ML (ref 5.2–26.5)
PTH-INTACT SERPL-MCNC: 79.4 PG/ML (ref 9–77)
T3FREE SERPL-MCNC: 2.8 PG/ML (ref 2.3–4.2)
VIT B12 SERPL-MCNC: 1188 PG/ML (ref 210–950)

## 2023-05-09 ENCOUNTER — PATIENT MESSAGE (OUTPATIENT)
Dept: FAMILY MEDICINE | Facility: CLINIC | Age: 43
End: 2023-05-09
Payer: COMMERCIAL

## 2023-05-09 DIAGNOSIS — E66.01 CLASS 3 SEVERE OBESITY DUE TO EXCESS CALORIES WITH BODY MASS INDEX (BMI) OF 45.0 TO 49.9 IN ADULT, UNSPECIFIED WHETHER SERIOUS COMORBIDITY PRESENT: Primary | ICD-10-CM

## 2023-05-09 DIAGNOSIS — R79.89 ELEVATED PROLACTIN LEVEL: ICD-10-CM

## 2023-05-09 DIAGNOSIS — R79.89 HIGH SERUM VITAMIN D: ICD-10-CM

## 2023-05-14 ENCOUNTER — PATIENT MESSAGE (OUTPATIENT)
Dept: FAMILY MEDICINE | Facility: CLINIC | Age: 43
End: 2023-05-14
Payer: COMMERCIAL

## 2023-05-14 NOTE — PROGRESS NOTES
Stop your vitamin D supplement. B12, iron, and thyroid all look much better. We will recheck the vitamin D level around 4 weeks.

## 2023-05-22 DIAGNOSIS — R42 VERTIGO: Primary | ICD-10-CM

## 2023-05-22 RX ORDER — MECLIZINE HYDROCHLORIDE 25 MG/1
25 TABLET ORAL 3 TIMES DAILY PRN
Qty: 30 TABLET | Refills: 1 | Status: SHIPPED | OUTPATIENT
Start: 2023-05-22

## 2023-05-22 NOTE — PROGRESS NOTES
See portal msgs regarding dizziness. Pt clarified she has a spinning sensation--which is vertigo.  PT order signed, not sure who nearby can help with vestibular/vertigo/inner ear. Please forward to PT office and update me which facility so that I can know in the future.    Pt lives in .    Notify pt as well so that she can call to schedule session.    If PT office requires OV notes, schedule VV or in person for documentation. Hopefully the notes in portal msg chain will suffice.

## 2023-05-23 ENCOUNTER — PATIENT MESSAGE (OUTPATIENT)
Dept: PRIMARY CARE CLINIC | Facility: CLINIC | Age: 43
End: 2023-05-23
Payer: COMMERCIAL

## 2023-05-24 LAB
PAP RECOMMENDATION EXT: NORMAL
PAP SMEAR: NORMAL

## 2023-05-26 ENCOUNTER — PATIENT MESSAGE (OUTPATIENT)
Dept: PRIMARY CARE CLINIC | Facility: CLINIC | Age: 43
End: 2023-05-26
Payer: COMMERCIAL

## 2023-05-26 ENCOUNTER — PATIENT MESSAGE (OUTPATIENT)
Dept: FAMILY MEDICINE | Facility: CLINIC | Age: 43
End: 2023-05-26
Payer: COMMERCIAL

## 2023-05-31 ENCOUNTER — OFFICE VISIT (OUTPATIENT)
Dept: FAMILY MEDICINE | Facility: CLINIC | Age: 43
End: 2023-05-31
Payer: COMMERCIAL

## 2023-05-31 VITALS
OXYGEN SATURATION: 96 % | HEART RATE: 96 BPM | DIASTOLIC BLOOD PRESSURE: 80 MMHG | BODY MASS INDEX: 48.82 KG/M2 | WEIGHT: 293 LBS | SYSTOLIC BLOOD PRESSURE: 132 MMHG | HEIGHT: 65 IN

## 2023-05-31 DIAGNOSIS — R79.89 HIGH SERUM VITAMIN D: ICD-10-CM

## 2023-05-31 DIAGNOSIS — E66.01 CLASS 3 SEVERE OBESITY DUE TO EXCESS CALORIES WITH SERIOUS COMORBIDITY AND BODY MASS INDEX (BMI) OF 45.0 TO 49.9 IN ADULT: ICD-10-CM

## 2023-05-31 DIAGNOSIS — R53.83 FATIGUE, UNSPECIFIED TYPE: ICD-10-CM

## 2023-05-31 DIAGNOSIS — R68.82 DECREASED LIBIDO: ICD-10-CM

## 2023-05-31 DIAGNOSIS — H60.90 OTITIS EXTERNA, UNSPECIFIED CHRONICITY, UNSPECIFIED LATERALITY, UNSPECIFIED TYPE: ICD-10-CM

## 2023-05-31 DIAGNOSIS — E53.8 B12 DEFICIENCY: ICD-10-CM

## 2023-05-31 DIAGNOSIS — K21.9 GASTROESOPHAGEAL REFLUX DISEASE, UNSPECIFIED WHETHER ESOPHAGITIS PRESENT: ICD-10-CM

## 2023-05-31 DIAGNOSIS — R79.89 INCREASED PTH LEVEL: ICD-10-CM

## 2023-05-31 DIAGNOSIS — R42 VERTIGO: Primary | ICD-10-CM

## 2023-05-31 PROCEDURE — 3075F PR MOST RECENT SYSTOLIC BLOOD PRESS GE 130-139MM HG: ICD-10-PCS | Mod: S$GLB,,, | Performed by: FAMILY MEDICINE

## 2023-05-31 PROCEDURE — 3008F BODY MASS INDEX DOCD: CPT | Mod: S$GLB,,, | Performed by: FAMILY MEDICINE

## 2023-05-31 PROCEDURE — 3079F DIAST BP 80-89 MM HG: CPT | Mod: S$GLB,,, | Performed by: FAMILY MEDICINE

## 2023-05-31 PROCEDURE — 99215 PR OFFICE/OUTPT VISIT, EST, LEVL V, 40-54 MIN: ICD-10-PCS | Mod: S$GLB,ICN,, | Performed by: FAMILY MEDICINE

## 2023-05-31 PROCEDURE — 86376 MICROSOMAL ANTIBODY EACH: CPT | Performed by: FAMILY MEDICINE

## 2023-05-31 PROCEDURE — 1159F PR MEDICATION LIST DOCUMENTED IN MEDICAL RECORD: ICD-10-PCS | Mod: S$GLB,,, | Performed by: FAMILY MEDICINE

## 2023-05-31 PROCEDURE — 1159F MED LIST DOCD IN RCRD: CPT | Mod: S$GLB,,, | Performed by: FAMILY MEDICINE

## 2023-05-31 PROCEDURE — 3008F PR BODY MASS INDEX (BMI) DOCUMENTED: ICD-10-PCS | Mod: S$GLB,,, | Performed by: FAMILY MEDICINE

## 2023-05-31 PROCEDURE — 99999 PR PBB SHADOW E&M-EST. PATIENT-LVL III: ICD-10-PCS | Mod: PBBFAC,,, | Performed by: FAMILY MEDICINE

## 2023-05-31 PROCEDURE — 1160F PR REVIEW ALL MEDS BY PRESCRIBER/CLIN PHARMACIST DOCUMENTED: ICD-10-PCS | Mod: S$GLB,,, | Performed by: FAMILY MEDICINE

## 2023-05-31 PROCEDURE — 99999 PR PBB SHADOW E&M-EST. PATIENT-LVL III: CPT | Mod: PBBFAC,,, | Performed by: FAMILY MEDICINE

## 2023-05-31 PROCEDURE — 99215 OFFICE O/P EST HI 40 MIN: CPT | Mod: S$GLB,ICN,, | Performed by: FAMILY MEDICINE

## 2023-05-31 PROCEDURE — 3075F SYST BP GE 130 - 139MM HG: CPT | Mod: S$GLB,,, | Performed by: FAMILY MEDICINE

## 2023-05-31 PROCEDURE — 82306 VITAMIN D 25 HYDROXY: CPT | Performed by: FAMILY MEDICINE

## 2023-05-31 PROCEDURE — 1160F RVW MEDS BY RX/DR IN RCRD: CPT | Mod: S$GLB,,, | Performed by: FAMILY MEDICINE

## 2023-05-31 PROCEDURE — 83520 IMMUNOASSAY QUANT NOS NONAB: CPT | Performed by: FAMILY MEDICINE

## 2023-05-31 PROCEDURE — 3079F PR MOST RECENT DIASTOLIC BLOOD PRESSURE 80-89 MM HG: ICD-10-PCS | Mod: S$GLB,,, | Performed by: FAMILY MEDICINE

## 2023-05-31 PROCEDURE — 84445 ASSAY OF TSI GLOBULIN: CPT | Performed by: FAMILY MEDICINE

## 2023-05-31 RX ORDER — CYANOCOBALAMIN 1000 UG/ML
1000 INJECTION, SOLUTION INTRAMUSCULAR; SUBCUTANEOUS ONCE
Status: DISCONTINUED | OUTPATIENT
Start: 2023-05-31 | End: 2023-05-31

## 2023-05-31 RX ORDER — NEOMYCIN SULFATE, POLYMYXIN B SULFATE AND HYDROCORTISONE 10; 3.5; 1 MG/ML; MG/ML; [USP'U]/ML
3 SUSPENSION/ DROPS AURICULAR (OTIC) 3 TIMES DAILY
Qty: 10 ML | Refills: 0 | Status: SHIPPED | OUTPATIENT
Start: 2023-05-31

## 2023-05-31 NOTE — PROGRESS NOTES
"  Subjective:       Patient ID: Dyan Wiseman is a 42 y.o. female.    Chief Complaint: Follow-up    43yo female with long hx of morbid obesity here for follow up.  Last 2 years dec libido and fatigue.  Had iron def, vit B12 def, vit D def which all have improved (vit D 104, pt held vit D supp x 1 month and rechecking levels today.)  PTH slightly elevated.    C/o dizziness, feels fatigued. Dizziness has been more frequent lately, pat admits to not drinking water throughout the day. Sometimes feels the room spinning, sometimes feels lightheaded. Not really associated with anything in particular, such as food or drink or motion.    Labs from May 2023 reviewed with patient.    Depression Patient Health Questionnaire 1/13/2023 12/12/2022   Over the last two weeks how often have you been bothered by little interest or pleasure in doing things Not at all Not at all   Over the last two weeks how often have you been bothered by feeling down, depressed or hopeless Not at all Not at all   PHQ-2 Total Score 0 0     Review of Systems   All other systems reviewed and are negative.      Past Medical History:   Diagnosis Date    History of COVID-19     7/20, 5/21    Personal history of kidney stones     "all kinds of stones" total of 9 times she was seen in the ER for kidney stones     Past Surgical History:   Procedure Laterality Date    LITHOTRIPSY  1998    SURGICAL REMOVAL OF NODULE OF VOCAL CORD Bilateral 1986     Family History   Problem Relation Age of Onset    Hypertension Mother     Colon cancer Father     Cancer Father         Colon    Breast cancer Sister 50    Cancer Sister         Breast    Thyroid cancer Sister 30    Cancer Sister         Thyroid    Coronary artery disease Maternal Grandmother         triple bypass    Heart disease Maternal Grandmother        Review of patient's allergies indicates:   Allergen Reactions    Pcn [penicillins] Rash       Current Outpatient Medications:     cholecalciferol, vitamin D3, " "(DECARA) 1,250 mcg (50,000 unit) capsule, Take 1 capsule (50,000 Units total) by mouth twice a week. With a meal or with a spoonful of peanut butter for low vitamin D., Disp: 24 capsule, Rfl: 0    cyanocobalamin 1,000 mcg/mL injection, Inject into the muscle 1 mL daily for 1 week, then for 6 weeks, then every 2 weeks for 6 weeks, then as instructed by your doctor., Disp: 12 mL, Rfl: 3    ferrous gluconate (FERGON) 324 MG tablet, Take 324 mg by mouth daily with breakfast., Disp: , Rfl:     ibuprofen (ADVIL,MOTRIN) 200 MG tablet, Take 200 mg by mouth every 6 (six) hours as needed for Pain., Disp: , Rfl:     Lactobacillus rhamnosus GG (CULTURELLE) 10 billion cell capsule, Take 1 capsule by mouth once daily., Disp: , Rfl:     meclizine (ANTIVERT) 25 mg tablet, Take 1 tablet (25 mg total) by mouth 3 (three) times daily as needed for Dizziness., Disp: 30 tablet, Rfl: 1    phentermine (LOMAIRA) 8 mg Tab, Take 1 tablet by mouth 3 (three) times daily as needed (appetite suppression)., Disp: 90 each, Rfl: 0    neomycin-polymyxin-hydrocortisone (CORTISPORIN) 3.5-10,000-1 mg/mL-unit/mL-% otic suspension, Place 3 drops into both ears 3 (three) times daily., Disp: 10 mL, Rfl: 0      Objective:      /80 (BP Location: Left arm, Patient Position: Sitting, BP Method: Large (Manual))   Pulse 96   Ht 5' 4.5" (1.638 m)   Wt 133 kg (293 lb 5.2 oz)   SpO2 96%   BMI 49.57 kg/m²   Physical Exam  Vitals and nursing note reviewed.   Constitutional:       General: She is not in acute distress.     Appearance: Normal appearance. She is well-developed. She is obese. She is not ill-appearing, toxic-appearing or diaphoretic.   HENT:      Head: Normocephalic and atraumatic.      Right Ear: External ear normal. There is no impacted cerumen.      Left Ear: External ear normal. There is no impacted cerumen.      Ears:      Comments: Bilat ear canals appear flaky, inflamed, slightly erythematous     Nose: No congestion.      Mouth/Throat:    "   Mouth: Mucous membranes are moist.      Pharynx: Oropharynx is clear. No oropharyngeal exudate or posterior oropharyngeal erythema.   Eyes:      General: No scleral icterus.        Right eye: No discharge.         Left eye: No discharge.      Extraocular Movements: Extraocular movements intact.      Conjunctiva/sclera: Conjunctivae normal.      Pupils: Pupils are equal, round, and reactive to light.   Neck:      Thyroid: No thyromegaly.      Vascular: No carotid bruit or JVD.      Trachea: No tracheal deviation.   Cardiovascular:      Rate and Rhythm: Normal rate and regular rhythm.      Pulses: Normal pulses.      Heart sounds: Normal heart sounds. No murmur heard.  Pulmonary:      Effort: Pulmonary effort is normal. No respiratory distress.      Breath sounds: Normal breath sounds. No wheezing.   Abdominal:      General: There is no distension.   Musculoskeletal:      Cervical back: Neck supple. No tenderness.      Right lower leg: No edema.      Left lower leg: No edema.   Lymphadenopathy:      Cervical: No cervical adenopathy.   Skin:     General: Skin is warm and dry.      Capillary Refill: Capillary refill takes less than 2 seconds.      Coloration: Skin is not jaundiced or pale.      Findings: No erythema or rash.   Neurological:      General: No focal deficit present.      Mental Status: She is alert and oriented to person, place, and time. Mental status is at baseline.      Motor: No weakness.      Coordination: Coordination normal.      Gait: Gait normal.   Psychiatric:         Mood and Affect: Mood normal.         Behavior: Behavior normal.         Thought Content: Thought content normal.         Judgment: Judgment normal.       Assessment:       1. Vertigo    2. High serum vitamin D    3. Otitis externa, unspecified chronicity, unspecified laterality, unspecified type    4. B12 deficiency    5. Increased PTH level    6. Fatigue, unspecified type    7. Decreased libido    8. Class 3 severe obesity due  to excess calories with serious comorbidity and body mass index (BMI) of 45.0 to 49.9 in adult    9. Gastroesophageal reflux disease, unspecified whether esophagitis present        Plan:       Vertigo    High serum vitamin D  -     Vitamin D; Future; Expected date: 05/31/2023    Otitis externa, unspecified chronicity, unspecified laterality, unspecified type  -     neomycin-polymyxin-hydrocortisone (CORTISPORIN) 3.5-10,000-1 mg/mL-unit/mL-% otic suspension; Place 3 drops into both ears 3 (three) times daily.  Dispense: 10 mL; Refill: 0    B12 deficiency  -     cyanocobalamin injection 1,000 mcg    Increased PTH level  -     PTH, Intact; Future; Expected date: 05/31/2023    Fatigue, unspecified type  -     Thyroid Stimulating Immunoglobulin; Future; Expected date: 05/31/2023  -     Thyroid Peroxidase Antibody; Future; Expected date: 05/31/2023  -     Thyrotropin Receptor Antibody; Future; Expected date: 05/31/2023    Decreased libido    Class 3 severe obesity due to excess calories with serious comorbidity and body mass index (BMI) of 45.0 to 49.9 in adult    Gastroesophageal reflux disease, unspecified whether esophagitis present    Recent labs resulted in [facility] were reviewed in detail with patient and all questions answered to his/her satisfaction.    E consult for endocrinology--fatigue, diff wt loss, dec libido    Discussed PT referral for BPPV, though dx unclear as she did not have nystagmus on exam    Cortisporin otic drops for bilat OE    Push fliuds, set timer for water intake    Inc protein intake, decrease carbohydrates        Previous records in Epic were reviewed, including the last 3 months of encounters, imaging, laboratory, and pathology reports.    Strict return precautions reviewed and patient verbalized understanding. Risks, benefits, and alternatives to the plan were reviewed in detail and all questions answered to the patient's satisfaction. Patient agrees to return to clinic or ER if symptoms  worsen. 40 minutes total were spent on today's visit, not limited to but including time based on counseling and coordination of care.    Patient instructed that best way to communicate with my office staff is for patient to get on the [facility] epic patient portal to expedite communication and communication issues that may occur.  Patient was given instructions on how to get on the portal.  I encouraged patient to obtain portal access as well.  Ultimately it is up to the patient to obtain access.  Patient voiced understanding.    This note was created using Integrated biometrics voice recognition software that occasionally may misinterpret phrases or words.    Follow up in about 3 months (around 8/31/2023) for follow up.

## 2023-06-01 LAB
25(OH)D3+25(OH)D2 SERPL-MCNC: 77 NG/ML (ref 30–96)
THYROPEROXIDASE IGG SERPL-ACNC: 17.2 IU/ML

## 2023-06-02 LAB — TSH RECEP AB SER-ACNC: <1.1 IU/L (ref 0–1.75)

## 2023-06-02 NOTE — PROGRESS NOTES
Your thyroid anitbody for TPO was positive. Not super high, but that could explain some of the symptoms you have. The vitamin D level is down to 77. There are still other antibody levels pending, and I will message when those result.    Start a selenium 200mcg nightly. This can help with thyroid inflammation. We will repeat those antibody levels in 3 months.

## 2023-06-05 LAB — TSI SER-ACNC: <0.1 IU/L

## 2023-06-12 ENCOUNTER — E-CONSULT (OUTPATIENT)
Dept: ENDOCRINOLOGY | Facility: CLINIC | Age: 43
End: 2023-06-12
Payer: COMMERCIAL

## 2023-06-12 DIAGNOSIS — E06.3 HASHIMOTO'S DISEASE: ICD-10-CM

## 2023-06-12 DIAGNOSIS — E66.01 CLASS 3 SEVERE OBESITY DUE TO EXCESS CALORIES WITH SERIOUS COMORBIDITY AND BODY MASS INDEX (BMI) OF 45.0 TO 49.9 IN ADULT: Primary | ICD-10-CM

## 2023-06-12 PROCEDURE — 99451 PR INTERPROF, PHONE/INTERNET/EHR, CONSULT, >= 5MINS: ICD-10-PCS | Mod: S$GLB,,, | Performed by: INTERNAL MEDICINE

## 2023-06-12 PROCEDURE — 99451 NTRPROF PH1/NTRNET/EHR 5/>: CPT | Mod: S$GLB,,, | Performed by: INTERNAL MEDICINE

## 2023-06-12 NOTE — CONSULTS
Simone Styles - [facility] Diabetes 6th Fl  Response for E-Consult     Patient Name: Dyan Wiseman  MRN: [id number]  Primary Care Provider: Mary Morales MD   Requesting Provider: Mary Morales MD  E-Consult to Endocrinology  Consult performed by: Malini Blue MD  Consult ordered by: Mary Morales MD        Findings:  42-year-old with multiple concerns.      As for the obesity, from an endocrine standpoint we would evaluate for hypothyroidism and cortisol excess, if clinically suspected.  If the TSH is normal, can consider 1 mg overnight dexamethasone suppression test.  If normal would refer to bariatric medicine for medical management or consider incretin therapy in primary care.  Also would screen periodically with hemoglobin A1c and lipids.  Low threshold for initiation of metformin for diabetes prevention.  Lastly, consider evaluation for sleep apnea as this is a common cause of fatigue.    As for the positive TPO, Hashimoto's is quite common.  No need to specifically target thyroid antibodies but would check TSH yearly and would initiate treatment if TSH is greater than 10, if the TSH is mildly elevated with symptoms of hypothyroidism and/or goiter, or a TSH greater than 2.5 in the context of pregnancy or desire for pregnancy.  Also could add selenium supplementation to decrease thyroid antibodies but unclear clinical significance.  Dose would be 100 mcg twice a day.  No need to repeat thyroid antibody testing       As for the mildly elevated PTH in the context of a normal calcium and vitamin-D deficiency being treated....  Her vitamin-D was quite low in December of 2022 and is now replete, actually bit overtreated.  Would decrease vitamin-D to 2000 IU a day.   It can taking upwards of a year for secondary hyperparathyroidism to resolved in the context of vitamin-D deficiency.  If hyperparathyroidism persists after 1 year of vitamin-D replacement or hypercalcemia develops,  would check a 24 hour urine  calcium and refer to Endocrine   As for the low libido, address above.  Can consider referral to [facility]         I did not speak to the requesting provider verbally about this.     Total time of Consultation: 10 minute    Percentage of time spent on written/verbal discussion: 100%     *This eConsult is based on the clinical data available to me and is furnished without benefit of a physical examination. The eConsult will need to be interpreted in light of any clinical issues or changes in patient status not available to me at the time of filing this eConsults. Significant changes in patient condition or level of acuity should result in immediate formal consultation and reevaluation. Please alert me if you have further questions.    Thank you for your consult.     MD Simone Wood - [facility] Diabetes 6th Fl

## 2023-06-14 ENCOUNTER — LAB VISIT (OUTPATIENT)
Dept: LAB | Facility: HOSPITAL | Age: 43
End: 2023-06-14
Payer: COMMERCIAL

## 2023-06-14 DIAGNOSIS — R79.89 HIGH SERUM VITAMIN D: ICD-10-CM

## 2023-06-14 DIAGNOSIS — R79.89 ELEVATED PROLACTIN LEVEL: ICD-10-CM

## 2023-06-14 LAB
25(OH)D3+25(OH)D2 SERPL-MCNC: 61 NG/ML (ref 30–96)
PROLACTIN SERPL IA-MCNC: 24.4 NG/ML (ref 5.2–26.5)

## 2023-06-14 PROCEDURE — 84146 ASSAY OF PROLACTIN: CPT | Performed by: FAMILY MEDICINE

## 2023-06-14 PROCEDURE — 36415 COLL VENOUS BLD VENIPUNCTURE: CPT | Performed by: FAMILY MEDICINE

## 2023-06-14 PROCEDURE — 82306 VITAMIN D 25 HYDROXY: CPT | Performed by: FAMILY MEDICINE

## 2023-06-27 ENCOUNTER — PATIENT MESSAGE (OUTPATIENT)
Dept: FAMILY MEDICINE | Facility: CLINIC | Age: 43
End: 2023-06-27
Payer: COMMERCIAL

## 2023-06-27 DIAGNOSIS — R53.83 FATIGUE, UNSPECIFIED TYPE: Primary | ICD-10-CM

## 2023-06-27 DIAGNOSIS — E06.3 HASHIMOTO'S DISEASE: ICD-10-CM

## 2023-06-27 DIAGNOSIS — R79.89 INCREASED PROLACTIN LEVEL: ICD-10-CM

## 2023-06-27 RX ORDER — DEXAMETHASONE 1 MG/1
TABLET ORAL
Qty: 1 TABLET | Refills: 0 | Status: SHIPPED | OUTPATIENT
Start: 2023-06-27

## 2023-06-27 NOTE — PROGRESS NOTES
Dexamethasone suppression test ordered. Schedule lab draw for LIO at 745-8am sharp the morning after she takes a 1mg dexamethasone tablet between 1p and 12a.    She will need follow up with me last week July or first week August. VV or in person.

## 2023-08-04 ENCOUNTER — PATIENT MESSAGE (OUTPATIENT)
Dept: FAMILY MEDICINE | Facility: CLINIC | Age: 43
End: 2023-08-04
Payer: COMMERCIAL

## 2023-08-11 ENCOUNTER — OFFICE VISIT (OUTPATIENT)
Dept: FAMILY MEDICINE | Facility: CLINIC | Age: 43
End: 2023-08-11
Payer: COMMERCIAL

## 2023-08-11 DIAGNOSIS — R53.83 FATIGUE, UNSPECIFIED TYPE: Primary | ICD-10-CM

## 2023-08-11 DIAGNOSIS — R68.82 DECREASED LIBIDO: ICD-10-CM

## 2023-08-11 DIAGNOSIS — E66.01 CLASS 3 SEVERE OBESITY DUE TO EXCESS CALORIES WITH SERIOUS COMORBIDITY AND BODY MASS INDEX (BMI) OF 45.0 TO 49.9 IN ADULT: ICD-10-CM

## 2023-08-11 PROCEDURE — 99214 OFFICE O/P EST MOD 30 MIN: CPT | Mod: 95,,, | Performed by: FAMILY MEDICINE

## 2023-08-11 PROCEDURE — 1159F PR MEDICATION LIST DOCUMENTED IN MEDICAL RECORD: ICD-10-PCS | Mod: 95,,, | Performed by: FAMILY MEDICINE

## 2023-08-11 PROCEDURE — 1159F MED LIST DOCD IN RCRD: CPT | Mod: 95,,, | Performed by: FAMILY MEDICINE

## 2023-08-11 PROCEDURE — 99214 PR OFFICE/OUTPT VISIT, EST, LEVL IV, 30-39 MIN: ICD-10-PCS | Mod: 95,,, | Performed by: FAMILY MEDICINE

## 2023-08-11 PROCEDURE — 1160F RVW MEDS BY RX/DR IN RCRD: CPT | Mod: 95,,, | Performed by: FAMILY MEDICINE

## 2023-08-11 PROCEDURE — 1160F PR REVIEW ALL MEDS BY PRESCRIBER/CLIN PHARMACIST DOCUMENTED: ICD-10-PCS | Mod: 95,,, | Performed by: FAMILY MEDICINE

## 2023-08-11 RX ORDER — BUPROPION HYDROCHLORIDE 300 MG/1
300 TABLET ORAL DAILY
Qty: 30 TABLET | Refills: 2 | Status: SHIPPED | OUTPATIENT
Start: 2023-08-11 | End: 2024-03-29

## 2023-08-11 RX ORDER — BUPROPION HYDROCHLORIDE 150 MG/1
150 TABLET ORAL DAILY
Qty: 30 TABLET | Refills: 2 | Status: SHIPPED | OUTPATIENT
Start: 2023-08-11 | End: 2024-08-10

## 2023-08-11 NOTE — PROGRESS NOTES
"The patient location is: [place]  The chief complaint leading to consultation is: follow up    Visit type: audiovisual    Face to Face time with patient: 18 min  25 minutes of total time spent on the encounter, which includes face to face time and non-face to face time preparing to see the patient (eg, review of tests), Obtaining and/or reviewing separately obtained history, Documenting clinical information in the electronic or other health record, Independently interpreting results (not separately reported) and communicating results to the patient/family/caregiver, or Care coordination (not separately reported).         Each patient to whom he or she provides medical services by telemedicine is:  (1) informed of the relationship between the physician and patient and the respective role of any other health care provider with respect to management of the patient; and (2) notified that he or she may decline to receive medical services by telemedicine and may withdraw from such care at any time.    Notes:     Subjective:       Patient ID: Dyan Wiseman is a 43 y.o. female.    Chief Complaint: Follow-up    Follow up virtual visit.    She is taking phentermine 8mg every morning, not weighing because the scale causes anxiety. Cut out all sugars. Feels lighter, clothes fit looser, and she describes it as "I don't feel like I'm walking through mud every day."    [employer] school, doing well so far.    Still without change in libido. Interested in trial of medication to help.    She has not scheduled the dexamethasone suppression test. Waiting until after her school finishes to complete that labwork.    Denies any spike in BP.        Depression Patient Health Questionnaire 1/13/2023 12/12/2022   Over the last two weeks how often have you been bothered by little interest or pleasure in doing things Not at all Not at all   Over the last two weeks how often have you been bothered by feeling down, depressed or hopeless Not at " "all Not at all   PHQ-2 Total Score 0 0     Review of Systems   Constitutional:  Negative for activity change and unexpected weight change.   HENT:  Negative for hearing loss, rhinorrhea and trouble swallowing.    Eyes:  Negative for discharge and visual disturbance.   Respiratory:  Negative for chest tightness and wheezing.    Cardiovascular:  Negative for chest pain and palpitations.   Gastrointestinal:  Negative for blood in stool, constipation, diarrhea and vomiting.   Endocrine: Negative for polydipsia and polyuria.   Genitourinary:  Negative for difficulty urinating, dysuria, hematuria and menstrual problem.   Musculoskeletal:  Negative for arthralgias, joint swelling and neck pain.   Neurological:  Negative for weakness and headaches.   Psychiatric/Behavioral:  Negative for confusion and dysphoric mood.    All other systems reviewed and are negative.        Past Medical History:   Diagnosis Date    History of COVID-19     7/20, 5/21    Personal history of kidney stones     "all kinds of stones" total of 9 times she was seen in the ER for kidney stones     Past Surgical History:   Procedure Laterality Date    LITHOTRIPSY  1998    SURGICAL REMOVAL OF NODULE OF VOCAL CORD Bilateral 1986     Family History   Problem Relation Age of Onset    Hypertension Mother     Colon cancer Father     Cancer Father         Colon    Breast cancer Sister 50    Cancer Sister         Breast    Thyroid cancer Sister 30    Cancer Sister         Thyroid    Coronary artery disease Maternal Grandmother         triple bypass    Heart disease Maternal Grandmother        Review of patient's allergies indicates:   Allergen Reactions    Pcn [penicillins] Rash       Current Outpatient Medications:     buPROPion (WELLBUTRIN XL) 150 MG TB24 tablet, Take 1 tablet (150 mg total) by mouth once daily., Disp: 30 tablet, Rfl: 2    buPROPion (WELLBUTRIN XL) 300 MG 24 hr tablet, Take 1 tablet (300 mg total) by mouth once daily., Disp: 30 tablet, Rfl: " 2    cholecalciferol, vitamin D3, (DECARA) 1,250 mcg (50,000 unit) capsule, Take 1 capsule (50,000 Units total) by mouth twice a week. With a meal or with a spoonful of peanut butter for low vitamin D., Disp: 24 capsule, Rfl: 0    cyanocobalamin 1,000 mcg/mL injection, Inject into the muscle 1 mL daily for 1 week, then for 6 weeks, then every 2 weeks for 6 weeks, then as instructed by your doctor., Disp: 12 mL, Rfl: 3    dexAMETHasone (DECADRON) 1 MG Tab, Take 1 tablet between 11p and 12a, then have your lab visit for blood draw between 7:45a and 8a later that morning, as scheduled., Disp: 1 tablet, Rfl: 0    ferrous gluconate (FERGON) 324 MG tablet, Take 324 mg by mouth daily with breakfast., Disp: , Rfl:     ibuprofen (ADVIL,MOTRIN) 200 MG tablet, Take 200 mg by mouth every 6 (six) hours as needed for Pain., Disp: , Rfl:     Lactobacillus rhamnosus GG (CULTURELLE) 10 billion cell capsule, Take 1 capsule by mouth once daily., Disp: , Rfl:     meclizine (ANTIVERT) 25 mg tablet, Take 1 tablet (25 mg total) by mouth 3 (three) times daily as needed for Dizziness., Disp: 30 tablet, Rfl: 1    neomycin-polymyxin-hydrocortisone (CORTISPORIN) 3.5-10,000-1 mg/mL-unit/mL-% otic suspension, Place 3 drops into both ears 3 (three) times daily., Disp: 10 mL, Rfl: 0    phentermine (LOMAIRA) 8 mg Tab, Take 1 tablet by mouth 3 (three) times daily as needed (appetite suppression)., Disp: 90 each, Rfl: 0    Objective:      There were no vitals taken for this visit.  Physical Exam  Constitutional:       General: She is not in acute distress.     Appearance: Normal appearance. She is obese. She is not ill-appearing or toxic-appearing.   Pulmonary:      Effort: Pulmonary effort is normal. No respiratory distress.   Neurological:      Mental Status: She is alert and oriented to person, place, and time.   Psychiatric:         Mood and Affect: Mood normal.         Behavior: Behavior normal.         Thought Content: Thought content normal.          Judgment: Judgment normal.         Assessment:       1. Fatigue, unspecified type    2. Decreased libido    3. Class 3 severe obesity due to excess calories with serious comorbidity and body mass index (BMI) of 45.0 to 49.9 in adult        Plan:       Fatigue, unspecified type    Decreased libido  -     buPROPion (WELLBUTRIN XL) 150 MG TB24 tablet; Take 1 tablet (150 mg total) by mouth once daily.  Dispense: 30 tablet; Refill: 2  -     buPROPion (WELLBUTRIN XL) 300 MG 24 hr tablet; Take 1 tablet (300 mg total) by mouth once daily.  Dispense: 30 tablet; Refill: 2    Class 3 severe obesity due to excess calories with serious comorbidity and body mass index (BMI) of 45.0 to 49.9 in adult  -     buPROPion (WELLBUTRIN XL) 150 MG TB24 tablet; Take 1 tablet (150 mg total) by mouth once daily.  Dispense: 30 tablet; Refill: 2  -     buPROPion (WELLBUTRIN XL) 300 MG 24 hr tablet; Take 1 tablet (300 mg total) by mouth once daily.  Dispense: 30 tablet; Refill: 2    Continue phentermine 8mg daily, inc if needed for appetite.  Add Wellbutrin XL 150mg daily to aid in decreased libido; after 4-6 weeks consider inc to 300mg daily.  Schedule follow up in 8 weeks for med check, will discuss wether or not to proceed with dexamethasone suppression test at that time.  So proud of her dietary changes, and look forward to update at follow up visit.  BARRINGTON Blackwell when needed.        Previous records in Epic were reviewed, including the last 3 months of encounters, imaging, laboratory, and pathology reports.    Strict return precautions reviewed and patient verbalized understanding. Risks, benefits, and alternatives to the plan were reviewed in detail and all questions answered to the patient's satisfaction. Patient agrees to return to clinic or ER if symptoms worsen. 25 minutes total were spent on today's visit, not limited to but including time based on counseling and coordination of care.    Patient instructed that best way to  communicate with my office staff is for patient to get on the [facility] epic patient portal to expedite communication and communication issues that may occur.  Patient was given instructions on how to get on the portal.  I encouraged patient to obtain portal access as well.  Ultimately it is up to the patient to obtain access.  Patient voiced understanding.    This note was created using [employer] voice recognition software that occasionally may misinterpret phrases or words.    Follow up in about 8 weeks (around 10/6/2023) for f/u wellbutrin, libido; weight check; fatigue.

## 2023-08-18 ENCOUNTER — TELEPHONE (OUTPATIENT)
Dept: FAMILY MEDICINE | Facility: CLINIC | Age: 43
End: 2023-08-18
Payer: COMMERCIAL

## 2023-08-18 NOTE — TELEPHONE ENCOUNTER
----- Message from Mary Morales MD sent at 8/11/2023  8:30 AM CDT -----  Needs 8wk follow up, VV or in person. Assist with sched if needed

## 2023-10-31 DIAGNOSIS — E66.01 CLASS 3 SEVERE OBESITY DUE TO EXCESS CALORIES WITH BODY MASS INDEX (BMI) OF 45.0 TO 49.9 IN ADULT, UNSPECIFIED WHETHER SERIOUS COMORBIDITY PRESENT: ICD-10-CM

## 2023-10-31 RX ORDER — PHENTERMINE HYDROCHLORIDE 8 MG/1
1 TABLET ORAL
Qty: 90 EACH | Refills: 0 | Status: SHIPPED | OUTPATIENT
Start: 2023-10-31

## 2023-11-08 ENCOUNTER — OFFICE VISIT (OUTPATIENT)
Dept: FAMILY MEDICINE | Facility: CLINIC | Age: 43
End: 2023-11-08
Payer: COMMERCIAL

## 2023-11-08 DIAGNOSIS — D50.9 IRON DEFICIENCY ANEMIA, UNSPECIFIED IRON DEFICIENCY ANEMIA TYPE: ICD-10-CM

## 2023-11-08 DIAGNOSIS — E53.8 B12 DEFICIENCY: ICD-10-CM

## 2023-11-08 DIAGNOSIS — Z12.31 ENCOUNTER FOR SCREENING MAMMOGRAM FOR MALIGNANT NEOPLASM OF BREAST: ICD-10-CM

## 2023-11-08 DIAGNOSIS — E06.3 HASHIMOTO'S DISEASE: ICD-10-CM

## 2023-11-08 DIAGNOSIS — T75.3XXS MOTION SICKNESS, SEQUELA: Primary | ICD-10-CM

## 2023-11-08 DIAGNOSIS — Z13.6 ENCOUNTER FOR LIPID SCREENING FOR CARDIOVASCULAR DISEASE: ICD-10-CM

## 2023-11-08 DIAGNOSIS — Z13.220 ENCOUNTER FOR LIPID SCREENING FOR CARDIOVASCULAR DISEASE: ICD-10-CM

## 2023-11-08 DIAGNOSIS — Z13.89 SCREENING FOR BLOOD OR PROTEIN IN URINE: ICD-10-CM

## 2023-11-08 DIAGNOSIS — E66.9 OBESITY, UNSPECIFIED CLASSIFICATION, UNSPECIFIED OBESITY TYPE, UNSPECIFIED WHETHER SERIOUS COMORBIDITY PRESENT: ICD-10-CM

## 2023-11-08 PROCEDURE — 1160F RVW MEDS BY RX/DR IN RCRD: CPT | Mod: 95,,, | Performed by: FAMILY MEDICINE

## 2023-11-08 PROCEDURE — 1160F PR REVIEW ALL MEDS BY PRESCRIBER/CLIN PHARMACIST DOCUMENTED: ICD-10-PCS | Mod: 95,,, | Performed by: FAMILY MEDICINE

## 2023-11-08 PROCEDURE — 99215 PR OFFICE/OUTPT VISIT, EST, LEVL V, 40-54 MIN: ICD-10-PCS | Mod: 95,,, | Performed by: FAMILY MEDICINE

## 2023-11-08 PROCEDURE — 99215 OFFICE O/P EST HI 40 MIN: CPT | Mod: 95,,, | Performed by: FAMILY MEDICINE

## 2023-11-08 PROCEDURE — 1159F MED LIST DOCD IN RCRD: CPT | Mod: 95,,, | Performed by: FAMILY MEDICINE

## 2023-11-08 PROCEDURE — 1159F PR MEDICATION LIST DOCUMENTED IN MEDICAL RECORD: ICD-10-PCS | Mod: 95,,, | Performed by: FAMILY MEDICINE

## 2023-11-08 RX ORDER — SCOLOPAMINE TRANSDERMAL SYSTEM 1 MG/1
1 PATCH, EXTENDED RELEASE TRANSDERMAL
Qty: 10 PATCH | Refills: 0 | Status: SHIPPED | OUTPATIENT
Start: 2023-11-08

## 2023-11-08 NOTE — PROGRESS NOTES
Labs/Tests:  CBC:  Lab Results   Component Value Date    WBC 6.37 12/14/2022    RBC 4.67 12/14/2022    HGB 12.8 12/14/2022    HCT 39.5 12/14/2022    MCV 85 12/14/2022    MCH 27.4 12/14/2022    MCHC 32.4 12/14/2022    RDW 13.6 12/14/2022     12/14/2022    MPV 10.1 12/14/2022    GRAN 3.9 12/14/2022    GRAN 61.8 12/14/2022    LYMPH 1.7 12/14/2022    LYMPH 27.2 12/14/2022    MONO 0.5 12/14/2022    MONO 7.2 12/14/2022    EOS 0.2 12/14/2022    BASO 0.04 12/14/2022    EOSINOPHIL 2.7 12/14/2022    BASOPHIL 0.6 12/14/2022    DIFFMETHOD Automated 12/14/2022     CMP:  Lab Results   Component Value Date     12/14/2022    K 4.1 12/14/2022     12/14/2022    CO2 24 12/14/2022    BUN 11 12/14/2022    CREATININE 0.8 12/14/2022     12/14/2022    CALCIUM 8.7 12/14/2022    MG 2.0 12/14/2022    ALKPHOS 99 12/14/2022    PROT 7.2 12/14/2022    ALBUMIN 3.2 (L) 12/14/2022    BILITOT 0.4 12/14/2022    AST 11 12/14/2022    ALT 13 12/14/2022    ANIONGAP 8 12/14/2022    EGFRNORACEVR >60.0 12/14/2022     HA1C:  Lab Results   Component Value Date    HGBA1C 5.1 12/14/2022    ESTIMATEDAVG 100 12/14/2022     LIPIDS:  Lab Results   Component Value Date    CHOL 187 12/14/2022    TRIG 53 12/14/2022    HDL 48 12/14/2022    LDLCALC 128.4 12/14/2022    CHOLHDL 25.7 12/14/2022    TOTALCHOLEST 3.9 12/14/2022    NONHDLCHOL 139 12/14/2022     Magnesium:  Lab Results   Component Value Date    MG 2.0 12/14/2022     Iron / TIBC:  Lab Results   Component Value Date    IRON 66 05/03/2023    TRANSFERRIN 302 05/03/2023    TIBC 447 05/03/2023    FESATURATED 15 (L) 05/03/2023    FERRITIN 35 05/03/2023     TSH / T3 / T4:  Lab Results   Component Value Date    TSH 2.532 05/03/2023    B4ZTIHM 94 12/14/2022    T3FREE 2.8 05/03/2023    FREET4 1.01 05/03/2023     Vit B / Vit D:  Lab Results   Component Value Date    WDLHWIHI68 1188 (H) 05/03/2023    FKPGIHKB40QF 61 06/14/2023       Subjective:       Patient ID: Dyan Wiseman is a 43 y.o.  "female.    Chief Complaint: Follow-up    Follow up.    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    Mood is good, currently on Wellbutrin XL 150mg daily.    B12 inejction 1mg IM q other week.    Rx D3 50,000 IU once monthly.    Still takign iron, probiotic, and selenium.    Takes Lomaria 8mg with Wellbutrin XL 150mg.          1/13/2023     8:49 AM 12/12/2022     7:41 AM   Depression Patient Health Questionnaire   Over the last two weeks how often have you been bothered by little interest or pleasure in doing things Not at all Not at all   Over the last two weeks how often have you been bothered by feeling down, depressed or hopeless Not at all Not at all   PHQ-2 Total Score 0 0          No data to display                Review of Systems   Constitutional:  Negative for activity change.   HENT:  Negative for hearing loss, rhinorrhea and trouble swallowing.    Eyes:  Negative for discharge and visual disturbance.   Respiratory:  Negative for chest tightness and wheezing.    Cardiovascular:  Negative for chest pain and palpitations.   Gastrointestinal:  Negative for blood in stool, constipation, diarrhea and vomiting.   Endocrine: Negative for polydipsia and polyuria.   Genitourinary:  Negative for difficulty urinating, dysuria, hematuria and menstrual problem.   Musculoskeletal:  Negative for arthralgias and joint swelling.   Neurological:  Negative for weakness and headaches.   Psychiatric/Behavioral:  Negative for confusion and dysphoric mood.    All other systems reviewed and are negative.        Past Medical History:   Diagnosis Date    History of COVID-19     7/20, 5/21    Personal history of kidney stones     "all kinds of stones" total of 9 times she was seen in the ER for kidney stones     Past Surgical History:   Procedure Laterality Date    LITHOTRIPSY  1998    SURGICAL REMOVAL OF NODULE OF VOCAL CORD Bilateral 1986     Family History   Problem Relation " Age of Onset    Hypertension Mother     Colon cancer Father     Cancer Father         Colon    Breast cancer Sister 50    Cancer Sister         Breast    Thyroid cancer Sister 30    Cancer Sister         Thyroid    Coronary artery disease Maternal Grandmother         triple bypass    Heart disease Maternal Grandmother        Review of patient's allergies indicates:   Allergen Reactions    Pcn [penicillins] Rash       Current Outpatient Medications:     buPROPion (WELLBUTRIN XL) 150 MG TB24 tablet, Take 1 tablet (150 mg total) by mouth once daily., Disp: 30 tablet, Rfl: 2    buPROPion (WELLBUTRIN XL) 300 MG 24 hr tablet, Take 1 tablet (300 mg total) by mouth once daily., Disp: 30 tablet, Rfl: 2    cholecalciferol, vitamin D3, (DECARA) 1,250 mcg (50,000 unit) capsule, Take 1 capsule (50,000 Units total) by mouth twice a week. With a meal or with a spoonful of peanut butter for low vitamin D., Disp: 24 capsule, Rfl: 0    cyanocobalamin 1,000 mcg/mL injection, Inject into the muscle 1 mL daily for 1 week, then for 6 weeks, then every 2 weeks for 6 weeks, then as instructed by your doctor., Disp: 12 mL, Rfl: 3    dexAMETHasone (DECADRON) 1 MG Tab, Take 1 tablet between 11p and 12a, then have your lab visit for blood draw between 7:45a and 8a later that morning, as scheduled., Disp: 1 tablet, Rfl: 0    ferrous gluconate (FERGON) 324 MG tablet, Take 324 mg by mouth daily with breakfast., Disp: , Rfl:     ibuprofen (ADVIL,MOTRIN) 200 MG tablet, Take 200 mg by mouth every 6 (six) hours as needed for Pain., Disp: , Rfl:     Lactobacillus rhamnosus GG (CULTURELLE) 10 billion cell capsule, Take 1 capsule by mouth once daily., Disp: , Rfl:     meclizine (ANTIVERT) 25 mg tablet, Take 1 tablet (25 mg total) by mouth 3 (three) times daily as needed for Dizziness., Disp: 30 tablet, Rfl: 1    neomycin-polymyxin-hydrocortisone (CORTISPORIN) 3.5-10,000-1 mg/mL-unit/mL-% otic suspension, Place 3 drops into both ears 3 (three) times  daily., Disp: 10 mL, Rfl: 0    phentermine (LOMAIRA) 8 mg Tab, Take 1 tablet by mouth three times daily as needed, Disp: 90 each, Rfl: 0    scopolamine (TRANSDERM-SCOP) 1.3-1.5 mg (1 mg over 3 days), Place 1 patch onto the skin every 72 hours., Disp: 10 patch, Rfl: 0      Objective:      There were no vitals taken for this visit.  Physical Exam  Constitutional:       General: She is not in acute distress.  Pulmonary:      Effort: Pulmonary effort is normal. No respiratory distress.   Neurological:      Mental Status: She is alert and oriented to person, place, and time.   Psychiatric:         Mood and Affect: Mood normal.         Behavior: Behavior normal.         Thought Content: Thought content normal.         Judgment: Judgment normal.         Assessment:       1. Motion sickness, sequela    2. Hashimoto's disease    3. B12 deficiency    4. Iron deficiency anemia, unspecified iron deficiency anemia type    5. Encounter for lipid screening for cardiovascular disease    6. Obesity, unspecified classification, unspecified obesity type, unspecified whether serious comorbidity present    7. Screening for blood or protein in urine    8. Encounter for screening mammogram for malignant neoplasm of breast        Plan:       Motion sickness, sequela  -     scopolamine (TRANSDERM-SCOP) 1.3-1.5 mg (1 mg over 3 days); Place 1 patch onto the skin every 72 hours.  Dispense: 10 patch; Refill: 0    Hashimoto's disease  -     T4, Free; Future; Expected date: 11/08/2023  -     T3, Free; Future; Expected date: 11/08/2023  -     TSH; Future; Expected date: 11/08/2023  -     Thyroid Peroxidase Antibody; Future; Expected date: 11/08/2023  -     Thyroid Stimulating Immunoglobulin; Future; Expected date: 11/08/2023  -     Thyrotropin Receptor Antibody; Future; Expected date: 11/08/2023    B12 deficiency  -     Vitamin B12; Future; Expected date: 11/08/2023    Iron deficiency anemia, unspecified iron deficiency anemia type  -     CBC Auto  Differential; Future; Expected date: 11/08/2023  -     Comprehensive Metabolic Panel; Future; Expected date: 11/08/2023  -     Iron and TIBC; Future; Expected date: 11/08/2023  -     Hemoglobin A1C; Future; Expected date: 11/08/2023  -     Ferritin; Future; Expected date: 11/08/2023    Encounter for lipid screening for cardiovascular disease  -     Lipid Panel; Future; Expected date: 11/08/2023    Obesity, unspecified classification, unspecified obesity type, unspecified whether serious comorbidity present  -     Vitamin D; Future; Expected date: 11/08/2023    Screening for blood or protein in urine  -     Urinalysis, Reflex to Urine Culture Urine, Clean Catch    Encounter for screening mammogram for malignant neoplasm of breast  -     Mammo Digital Screening Bilat w/ Adolph; Future; Expected date: 11/08/2023            Previous records in Epic were reviewed, including the last 3 months of encounters, imaging, laboratory, and pathology reports.    Strict return precautions reviewed and patient verbalized understanding. Risks, benefits, and alternatives to the plan were reviewed in detail and all questions answered to the patient's satisfaction. Patient agrees to return to clinic or ER if symptoms worsen. 30 minutes total were spent on today's visit, not limited to but including time based on counseling and coordination of care.    Patient instructed that best way to communicate with my office staff is for patient to get on the [facility] portal to expedite communication and communication issues that may occur.  Patient was given instructions on how to get on the portal.  I encouraged patient to obtain portal access as well.  Ultimately it is up to the patient to obtain access.  Patient voiced understanding.    This note was created using [employer] voice recognition software that occasionally may misinterpret phrases or words.    Follow up in about 4 months (around 3/8/2024) for labs before follow up.

## 2023-12-19 ENCOUNTER — TELEPHONE (OUTPATIENT)
Dept: FAMILY MEDICINE | Facility: CLINIC | Age: 43
End: 2023-12-19
Payer: COMMERCIAL

## 2023-12-22 ENCOUNTER — HOSPITAL ENCOUNTER (OUTPATIENT)
Dept: RADIOLOGY | Facility: HOSPITAL | Age: 43
Discharge: HOME | End: 2023-12-22
Attending: FAMILY MEDICINE
Payer: COMMERCIAL

## 2023-12-22 DIAGNOSIS — Z12.31 ENCOUNTER FOR SCREENING MAMMOGRAM FOR MALIGNANT NEOPLASM OF BREAST: ICD-10-CM

## 2023-12-22 PROCEDURE — 77067 SCR MAMMO BI INCL CAD: CPT | Mod: 26,,, | Performed by: RADIOLOGY

## 2023-12-22 PROCEDURE — 77067 MAMMO DIGITAL SCREENING BILAT WITH TOMO: ICD-10-PCS | Mod: 26,,, | Performed by: RADIOLOGY

## 2023-12-22 PROCEDURE — 77063 BREAST TOMOSYNTHESIS BI: CPT | Mod: 26,,, | Performed by: RADIOLOGY

## 2023-12-22 PROCEDURE — 77067 SCR MAMMO BI INCL CAD: CPT | Mod: TC

## 2023-12-22 PROCEDURE — 77063 MAMMO DIGITAL SCREENING BILAT WITH TOMO: ICD-10-PCS | Mod: 26,,, | Performed by: RADIOLOGY

## 2023-12-30 ENCOUNTER — LAB VISIT (OUTPATIENT)
Dept: LAB | Facility: HOSPITAL | Age: 43
End: 2023-12-30
Attending: FAMILY MEDICINE
Payer: COMMERCIAL

## 2023-12-30 DIAGNOSIS — D50.9 IRON DEFICIENCY ANEMIA, UNSPECIFIED IRON DEFICIENCY ANEMIA TYPE: ICD-10-CM

## 2023-12-30 DIAGNOSIS — E06.3 HASHIMOTO'S DISEASE: ICD-10-CM

## 2023-12-30 DIAGNOSIS — E66.9 OBESITY, UNSPECIFIED CLASSIFICATION, UNSPECIFIED OBESITY TYPE, UNSPECIFIED WHETHER SERIOUS COMORBIDITY PRESENT: ICD-10-CM

## 2023-12-30 DIAGNOSIS — Z13.6 ENCOUNTER FOR LIPID SCREENING FOR CARDIOVASCULAR DISEASE: ICD-10-CM

## 2023-12-30 DIAGNOSIS — R53.83 FATIGUE, UNSPECIFIED TYPE: ICD-10-CM

## 2023-12-30 DIAGNOSIS — E53.8 B12 DEFICIENCY: ICD-10-CM

## 2023-12-30 DIAGNOSIS — R79.89 INCREASED PROLACTIN LEVEL: ICD-10-CM

## 2023-12-30 DIAGNOSIS — Z13.220 ENCOUNTER FOR LIPID SCREENING FOR CARDIOVASCULAR DISEASE: ICD-10-CM

## 2023-12-30 LAB
25(OH)D3+25(OH)D2 SERPL-MCNC: 39 NG/ML (ref 30–96)
ALBUMIN SERPL BCP-MCNC: 3.4 G/DL (ref 3.5–5.2)
ALP SERPL-CCNC: 97 U/L (ref 55–135)
ALT SERPL W/O P-5'-P-CCNC: 12 U/L (ref 10–44)
ANION GAP SERPL CALC-SCNC: 11 MMOL/L (ref 8–16)
AST SERPL-CCNC: 12 U/L (ref 10–40)
BASOPHILS # BLD AUTO: 0.04 K/UL (ref 0–0.2)
BASOPHILS NFR BLD: 0.6 % (ref 0–1.9)
BILIRUB SERPL-MCNC: 0.4 MG/DL (ref 0.1–1)
BUN SERPL-MCNC: 10 MG/DL (ref 6–20)
CALCIUM SERPL-MCNC: 9 MG/DL (ref 8.7–10.5)
CHLORIDE SERPL-SCNC: 105 MMOL/L (ref 95–110)
CHOLEST SERPL-MCNC: 212 MG/DL (ref 120–199)
CHOLEST/HDLC SERPL: 3.4 {RATIO} (ref 2–5)
CO2 SERPL-SCNC: 21 MMOL/L (ref 23–29)
CREAT SERPL-MCNC: 0.8 MG/DL (ref 0.5–1.4)
DIFFERENTIAL METHOD BLD: NORMAL
EOSINOPHIL # BLD AUTO: 0.2 K/UL (ref 0–0.5)
EOSINOPHIL NFR BLD: 2.7 % (ref 0–8)
ERYTHROCYTE [DISTWIDTH] IN BLOOD BY AUTOMATED COUNT: 12.5 % (ref 11.5–14.5)
EST. GFR  (NO RACE VARIABLE): >60 ML/MIN/1.73 M^2
ESTIMATED AVG GLUCOSE: 91 MG/DL (ref 68–131)
FERRITIN SERPL-MCNC: 51 NG/ML (ref 20–300)
GLUCOSE SERPL-MCNC: 110 MG/DL (ref 70–110)
HBA1C MFR BLD: 4.8 % (ref 4–5.6)
HCT VFR BLD AUTO: 41.7 % (ref 37–48.5)
HDLC SERPL-MCNC: 62 MG/DL (ref 40–75)
HDLC SERPL: 29.2 % (ref 20–50)
HGB BLD-MCNC: 13.7 G/DL (ref 12–16)
IMM GRANULOCYTES # BLD AUTO: 0.03 K/UL (ref 0–0.04)
IMM GRANULOCYTES NFR BLD AUTO: 0.4 % (ref 0–0.5)
IRON SERPL-MCNC: 60 UG/DL (ref 30–160)
LDLC SERPL CALC-MCNC: 128.4 MG/DL (ref 63–159)
LYMPHOCYTES # BLD AUTO: 1.8 K/UL (ref 1–4.8)
LYMPHOCYTES NFR BLD: 25.7 % (ref 18–48)
MCH RBC QN AUTO: 28.8 PG (ref 27–31)
MCHC RBC AUTO-ENTMCNC: 32.9 G/DL (ref 32–36)
MCV RBC AUTO: 88 FL (ref 82–98)
MONOCYTES # BLD AUTO: 0.6 K/UL (ref 0.3–1)
MONOCYTES NFR BLD: 7.9 % (ref 4–15)
NEUTROPHILS # BLD AUTO: 4.4 K/UL (ref 1.8–7.7)
NEUTROPHILS NFR BLD: 62.7 % (ref 38–73)
NONHDLC SERPL-MCNC: 150 MG/DL
NRBC BLD-RTO: 0 /100 WBC
PLATELET # BLD AUTO: 293 K/UL (ref 150–450)
PMV BLD AUTO: 9.8 FL (ref 9.2–12.9)
POTASSIUM SERPL-SCNC: 4.2 MMOL/L (ref 3.5–5.1)
PROT SERPL-MCNC: 7.3 G/DL (ref 6–8.4)
RBC # BLD AUTO: 4.75 M/UL (ref 4–5.4)
SATURATED IRON: 14 % (ref 20–50)
SODIUM SERPL-SCNC: 137 MMOL/L (ref 136–145)
T3FREE SERPL-MCNC: 2.6 PG/ML (ref 2.3–4.2)
T4 FREE SERPL-MCNC: 0.89 NG/DL (ref 0.71–1.51)
THYROPEROXIDASE IGG SERPL-ACNC: 11 IU/ML
TOTAL IRON BINDING CAPACITY: 431 UG/DL (ref 250–450)
TRANSFERRIN SERPL-MCNC: 291 MG/DL (ref 200–375)
TRIGL SERPL-MCNC: 108 MG/DL (ref 30–150)
TSH SERPL DL<=0.005 MIU/L-ACNC: 2.55 UIU/ML (ref 0.4–4)
VIT B12 SERPL-MCNC: 732 PG/ML (ref 210–950)
WBC # BLD AUTO: 7.08 K/UL (ref 3.9–12.7)

## 2023-12-30 PROCEDURE — 84445 ASSAY OF TSI GLOBULIN: CPT | Performed by: FAMILY MEDICINE

## 2023-12-30 PROCEDURE — 84439 ASSAY OF FREE THYROXINE: CPT | Performed by: FAMILY MEDICINE

## 2023-12-30 PROCEDURE — 83540 ASSAY OF IRON: CPT | Performed by: FAMILY MEDICINE

## 2023-12-30 PROCEDURE — 83036 HEMOGLOBIN GLYCOSYLATED A1C: CPT | Performed by: FAMILY MEDICINE

## 2023-12-30 PROCEDURE — 82530 CORTISOL FREE: CPT | Performed by: FAMILY MEDICINE

## 2023-12-30 PROCEDURE — 85025 COMPLETE CBC W/AUTO DIFF WBC: CPT | Performed by: FAMILY MEDICINE

## 2023-12-30 PROCEDURE — 82542 COL CHROMOTOGRAPHY QUAL/QUAN: CPT | Performed by: FAMILY MEDICINE

## 2023-12-30 PROCEDURE — 84481 FREE ASSAY (FT-3): CPT | Performed by: FAMILY MEDICINE

## 2023-12-30 PROCEDURE — 80053 COMPREHEN METABOLIC PANEL: CPT | Performed by: FAMILY MEDICINE

## 2023-12-30 PROCEDURE — 84443 ASSAY THYROID STIM HORMONE: CPT | Performed by: FAMILY MEDICINE

## 2023-12-30 PROCEDURE — 82607 VITAMIN B-12: CPT | Performed by: FAMILY MEDICINE

## 2023-12-30 PROCEDURE — 82306 VITAMIN D 25 HYDROXY: CPT | Performed by: FAMILY MEDICINE

## 2023-12-30 PROCEDURE — 80061 LIPID PANEL: CPT | Performed by: FAMILY MEDICINE

## 2023-12-30 PROCEDURE — 82728 ASSAY OF FERRITIN: CPT | Performed by: FAMILY MEDICINE

## 2023-12-30 PROCEDURE — 36415 COLL VENOUS BLD VENIPUNCTURE: CPT | Performed by: FAMILY MEDICINE

## 2023-12-30 PROCEDURE — 86376 MICROSOMAL ANTIBODY EACH: CPT | Performed by: FAMILY MEDICINE

## 2024-01-04 LAB — TSI SER-ACNC: <0.1 IU/L

## 2024-01-11 LAB
CORTIS F SERPL-MCNC: 0.48 MCG/DL
DEXAMETHASONE SERPL-MCNC: <20 NG/DL

## 2024-02-09 ENCOUNTER — OFFICE VISIT (OUTPATIENT)
Dept: FAMILY MEDICINE | Facility: CLINIC | Age: 44
End: 2024-02-09
Payer: COMMERCIAL

## 2024-02-09 DIAGNOSIS — J02.9 PHARYNGITIS, UNSPECIFIED ETIOLOGY: Primary | ICD-10-CM

## 2024-02-09 PROCEDURE — 99214 OFFICE O/P EST MOD 30 MIN: CPT | Mod: 95,,, | Performed by: STUDENT IN AN ORGANIZED HEALTH CARE EDUCATION/TRAINING PROGRAM

## 2024-02-09 PROCEDURE — 1159F MED LIST DOCD IN RCRD: CPT | Mod: 95,,, | Performed by: STUDENT IN AN ORGANIZED HEALTH CARE EDUCATION/TRAINING PROGRAM

## 2024-02-09 PROCEDURE — 1160F RVW MEDS BY RX/DR IN RCRD: CPT | Mod: 95,,, | Performed by: STUDENT IN AN ORGANIZED HEALTH CARE EDUCATION/TRAINING PROGRAM

## 2024-02-09 RX ORDER — AZITHROMYCIN 250 MG/1
TABLET, FILM COATED ORAL
Qty: 6 TABLET | Refills: 0 | Status: SHIPPED | OUTPATIENT
Start: 2024-02-09 | End: 2024-02-14

## 2024-02-09 RX ORDER — METHYLPREDNISOLONE 4 MG/1
TABLET ORAL
Qty: 21 EACH | Refills: 0 | Status: SHIPPED | OUTPATIENT
Start: 2024-02-09 | End: 2024-03-01

## 2024-02-09 NOTE — PROGRESS NOTES
Answers submitted by the patient for this visit:  Sore Throat Questionnaire (Submitted on 2/9/2024)  Chief Complaint: Sore throat  Chronicity: new  Onset: yesterday  Progression since onset: gradually worsening  Pain worse on: right  Fever: no fever  Pain - numeric: 4/10  abdominal pain: No  congestion: Yes  cough: Yes  diarrhea: No  drooling: No  ear discharge: No  ear pain: No  headaches: Yes  hoarse voice: Yes  neck pain: No  plugged ear sensation: No  shortness of breath: No  stridor: No  swollen glands: No  trouble swallowing: No  vomiting: No  mono: No  Treatments tried: NSAIDs, cool liquids  Improvement on treatment: moderate  Pain severity: mild  The patient location is: [place]   The chief complaint leading to consultation is: Sore Throat    Visit type: audiovisual    Face to Face time with patient: 9  12 minutes of total time spent on the encounter, which includes face to face time and non-face to face time preparing to see the patient (eg, review of tests), Obtaining and/or reviewing separately obtained history, Documenting clinical information in the electronic or other health record, Independently interpreting results (not separately reported) and communicating results to the patient/family/caregiver, or Care coordination (not separately reported).         Each patient to whom he or she provides medical services by telemedicine is:  (1) informed of the relationship between the physician and patient and the respective role of any other health care provider with respect to management of the patient; and (2) notified that he or she may decline to receive medical services by telemedicine and may withdraw from such care at any time.    [facility] Note    Subjective:    The HPI and pertinent ROS is included in the Diagnostic Impression Remarks section at the end of the note. Please see below for further details. Chief complaint is at end of note.     Dyan is a pleasant intelligent patient who  is here for evaluation.     Modified Medications    No medications on file       Data reviewed 274}  Previous medical records reviewed and summarized in plan section at end of note.      If you are due for any health screening(s) below please notify me so we can arrange them to be ordered and scheduled. Most healthy patients at your age complete them, but you are free to accept or refuse. If you can't do it, I'll definitely understand. If you can, I'd certainly appreciate it!     Tests to Keep You Healthy    Mammogram: Met on 12/22/2023  Cervical Cancer Screening: DUE      The following portions of the patient's history were reviewed and updated as appropriate: allergies, current medications, past family history, past medical history, past social history, past surgical history and problem list.    She  has a past medical history of History of COVID-19 and Personal history of kidney stones.  She  has a past surgical history that includes Surgical removal of nodule of vocal cord (Bilateral, 1986) and Lithotripsy (1998).    She  reports that she has never smoked. She has never used smokeless tobacco. She reports that she does not currently use alcohol. She reports that she does not use drugs.  She family history includes Breast cancer (age of onset: 50) in her sister; Cancer in her father, sister, and sister; Colon cancer in her father; Coronary artery disease in her maternal grandmother; Heart disease in her maternal grandmother; Hypertension in her mother; Thyroid cancer (age of onset: 30) in her sister.    Review of patient's allergies indicates:   Allergen Reactions    Pcn [penicillins] Rash       Tobacco Use: Low Risk  (2/9/2024)    Patient History     Smoking Tobacco Use: Never     Smokeless Tobacco Use: Never     Passive Exposure: Not on file     Physical Examination  General appearance: alert, cooperative, no distress  Raspy voice    BP Readings from Last 3 Encounters:   05/31/23 132/80   05/03/23 118/70  "  01/13/23 118/62     Wt Readings from Last 3 Encounters:   05/31/23 133 kg (293 lb 5.2 oz)   05/03/23 133.7 kg (294 lb 13.8 oz)   01/13/23 134.5 kg (296 lb 10.1 oz)     There were no vitals taken for this visit.   274}  Laboratory: I have reviewed old labs below:    274}    Lab Results   Component Value Date    WBC 7.08 12/30/2023    HGB 13.7 12/30/2023    HCT 41.7 12/30/2023    MCV 88 12/30/2023     12/30/2023     12/30/2023    K 4.2 12/30/2023     12/30/2023    CALCIUM 9.0 12/30/2023    CO2 21 (L) 12/30/2023     12/30/2023    BUN 10 12/30/2023    CREATININE 0.8 12/30/2023    EGFRNORACEVR >60.0 12/30/2023    ANIONGAP 11 12/30/2023    PROT 7.3 12/30/2023    ALBUMIN 3.4 (L) 12/30/2023    BILITOT 0.4 12/30/2023    ALKPHOS 97 12/30/2023    ALT 12 12/30/2023    AST 12 12/30/2023    CHOL 212 (H) 12/30/2023    TRIG 108 12/30/2023    HDL 62 12/30/2023    LDLCALC 128.4 12/30/2023    TSH 2.548 12/30/2023    HGBA1C 4.8 12/30/2023      Lab reviewed by me: Particular labs of significance that I will monitor, workup, or treat to improve are mentioned below in diagnostic impression remarks.    Imaging/EKG: I have reviewed the pertinent results and my findings are noted in remarks.  274}    CC: No chief complaint on file.       274}    Assessment/Plan  Dyan Wiseman is a 43 y.o. female who presents to clinic with:  1. Pharyngitis, unspecified etiology       274}  Diagnostic Impression Remarks + HPI     Documentation entered by me for this encounter may have been done in part using speech-recognition technology. Although I have made an effort to ensure accuracy, "sound like" errors may exist and should be interpreted in context.     Patient presents today with 1 day history of sore throat w/o fever. Patient develops strep throat multiple times a year that improves with abx and steriods. Will treat with similar treatment int he past. Will place referral to ENT. Patient agreed to be evaluated.     This is " the extent of this pleasant patient's concerns at this present time. She did not feel chest pain upon exertion, dyspnea, nausea, vomiting, diaphoresis, or syncope. No pleuritic chest pain, unilateral leg swelling, calf tenderness, or calf pain. Negative for unintentional weight loss night sweats, hematuria, and fevers. Dyan will return to clinic in a few months for further workup and reassessment or sooner as needed. She was instructed to call the clinic or go to the emergency department or urgent care immediately if her symptoms do not improve, worsens, or if any new symptoms develop. As we discussed that symptoms could worsen over the next 24 hours she was advised that if any increased swelling, pain, or numbness arise to go immediately to the ED. Patient knows to call any time if an emergency arises. Shared decision making occurred and she verbalized understanding in agreement with this plan. I discussed imaging findings, diagnosis, possibilities, treatment options, medications, risks, and benefits. She had many questions regarding the options and long-term effects. All questions were answered. She expressed understanding after counseling regarding the diagnosis and recommendations. She was capable and demonstrated competence with understanding of these options. Shared decision making was performed resulting in her choosing the current treatment plan. Patient handout was given with instructions and recommendations. Advised the patient that if they become pregnant to alert us immediately to assess for medication changes. Having a healthy weight can decrease the risk of 13 cancers and is an important goal. I also discussed the importance of close follow up to discuss labs, change or modify her medications if needed, monitor side effects, and further evaluation of medical problems.     Additional workup planned: see labs ordered below.    See below for labs and meds ordered with associated diagnosis      1.  Pharyngitis, unspecified etiology  - azithromycin (Z-JAIR) 250 MG tablet; Take 2 tablets by mouth on day 1; Take 1 tablet by mouth on days 2-5  Dispense: 6 tablet; Refill: 0  - methylPREDNISolone (MEDROL DOSEPACK) 4 mg tablet; use as directed  Dispense: 21 each; Refill: 0  - Ambulatory referral/consult to ENT; Future      Daylin Garland MD   274}    If you are due for any health screening(s) below please notify me so we can arrange them to be ordered and scheduled. Most healthy patients at your age complete them, but you are free to accept or refuse.     If you can't do it, I'll definitely understand. If you can, I'd certainly appreciate it!   Tests to Keep You Healthy    Mammogram: Met on 12/22/2023  Cervical Cancer Screening: DUE

## 2024-02-16 ENCOUNTER — OFFICE VISIT (OUTPATIENT)
Dept: OTOLARYNGOLOGY | Facility: CLINIC | Age: 44
End: 2024-02-16
Payer: COMMERCIAL

## 2024-02-16 VITALS — HEIGHT: 65 IN | WEIGHT: 279.38 LBS | BODY MASS INDEX: 46.55 KG/M2

## 2024-02-16 DIAGNOSIS — J02.9 PHARYNGITIS, UNSPECIFIED ETIOLOGY: ICD-10-CM

## 2024-02-16 PROCEDURE — 99999 PR PBB SHADOW E&M-EST. PATIENT-LVL IV: CPT | Mod: PBBFAC,,, | Performed by: OTOLARYNGOLOGY

## 2024-02-16 PROCEDURE — 3008F BODY MASS INDEX DOCD: CPT | Mod: S$GLB,,, | Performed by: OTOLARYNGOLOGY

## 2024-02-16 PROCEDURE — 99203 OFFICE O/P NEW LOW 30 MIN: CPT | Mod: S$GLB,,, | Performed by: OTOLARYNGOLOGY

## 2024-02-16 PROCEDURE — 1159F MED LIST DOCD IN RCRD: CPT | Mod: S$GLB,,, | Performed by: OTOLARYNGOLOGY

## 2024-02-16 PROCEDURE — 1160F RVW MEDS BY RX/DR IN RCRD: CPT | Mod: S$GLB,,, | Performed by: OTOLARYNGOLOGY

## 2024-02-16 RX ORDER — LEVOFLOXACIN 500 MG/1
500 TABLET, FILM COATED ORAL DAILY
Qty: 7 TABLET | Refills: 0 | Status: SHIPPED | OUTPATIENT
Start: 2024-02-16 | End: 2024-02-23

## 2024-02-16 NOTE — PROGRESS NOTES
Subjective:       Patient ID: Dyan Wiseman is a 43 y.o. female.    Chief Complaint: Sinus Problem (Pt c/o sore throat, cough and congestion for a week )      This 43-year-old comes in telling me that she developed symptoms suggestive of strep throat on Friday did a tele visit was given a Z-Shon, she is allergic to penicillins, and some prednisone.  The pain has improved but it is driving her crazy that she feels like there something stuck something thickened sticky and mucousy in the back of her throat she points going to high near her palate more.  She has not having any nasal symptoms.  She is really quite hoarse but when I ask her about that she tells me that she had some type of polyps she had not sure of the specifics when she was a young child and she always has a deep raspy voice in his not that much different than usual for her it has a known problem and not that related to her current illness          Objective:      ENT Physical Exam    Her tympanic membrane and ear canal in the right is normal on the left there is a slight amount of retraction and she says it does feel a bit stuffy     Her nasal exam looks great that has no congestion she has a good airway no evidence of an inflammation     Her oropharynx is remarkable for there being a few little white specks in the left tonsil none on the right there not enlarged and there some general posterior pharyngeal banding    Her neck is without adenopathy.        Assessment:       1. Pharyngitis, unspecified etiology         Plan:          So she has describing a fairly unpleasant sticky gooey stuff and she has a little residual material in her tonsil I do not think that is clear that she had strep throat but I gave her a week of Levaquin to finish off whatever is going on and I have offered to see her back to examine her larynx she tells me know wants looked in there since she was a child but her voice is fairly impaired she tells me it is often in fact  usually like this and I think that has a role for a fiberoptic laryngoscopy just to see where things stand she mentions polyps but she was very young child perhaps papillomas probably pretty hard to get those records

## 2024-03-08 ENCOUNTER — PATIENT MESSAGE (OUTPATIENT)
Dept: FAMILY MEDICINE | Facility: CLINIC | Age: 44
End: 2024-03-08

## 2024-03-19 ENCOUNTER — PATIENT MESSAGE (OUTPATIENT)
Dept: ADMINISTRATIVE | Facility: HOSPITAL | Age: 44
End: 2024-03-19
Payer: COMMERCIAL

## 2024-03-28 DIAGNOSIS — R68.82 DECREASED LIBIDO: ICD-10-CM

## 2024-03-28 DIAGNOSIS — E66.01 CLASS 3 SEVERE OBESITY DUE TO EXCESS CALORIES WITH SERIOUS COMORBIDITY AND BODY MASS INDEX (BMI) OF 45.0 TO 49.9 IN ADULT: ICD-10-CM

## 2024-03-29 RX ORDER — BUPROPION HYDROCHLORIDE 300 MG/1
300 TABLET ORAL
Qty: 90 TABLET | Refills: 2 | Status: SHIPPED | OUTPATIENT
Start: 2024-03-29 | End: 2024-04-04

## 2024-03-29 NOTE — TELEPHONE ENCOUNTER
Refill Decision Note   Dyan Bowen  is requesting a refill authorization.  Brief Assessment and Rationale for Refill:  Approve     Medication Therapy Plan:       Medication Reconciliation Completed: No   Comments:     No Care Gaps recommended.     Note composed:3:29 PM 03/29/2024

## 2024-03-29 NOTE — TELEPHONE ENCOUNTER
No care due was identified.  [facility] Embedded Care Due Messages. Reference number: [id number].   3/28/2024 8:58:22 PM CDT

## 2024-03-29 NOTE — TELEPHONE ENCOUNTER
No care due was identified.  [facility] Embedded Care Due Messages. Reference number: [id number].   3/28/2024 8:57:15 PM CDT

## 2024-04-01 RX ORDER — BUPROPION HYDROCHLORIDE 300 MG/1
300 TABLET ORAL DAILY
Qty: 30 TABLET | Refills: 2 | OUTPATIENT
Start: 2024-04-01 | End: 2025-04-01

## 2024-04-03 ENCOUNTER — PATIENT MESSAGE (OUTPATIENT)
Dept: ADMINISTRATIVE | Facility: HOSPITAL | Age: 44
End: 2024-04-03
Payer: COMMERCIAL

## 2024-04-04 ENCOUNTER — TELEPHONE (OUTPATIENT)
Dept: FAMILY MEDICINE | Facility: CLINIC | Age: 44
End: 2024-04-04
Payer: COMMERCIAL

## 2024-04-04 DIAGNOSIS — E66.01 CLASS 3 SEVERE OBESITY DUE TO EXCESS CALORIES WITH SERIOUS COMORBIDITY AND BODY MASS INDEX (BMI) OF 45.0 TO 49.9 IN ADULT: ICD-10-CM

## 2024-04-04 DIAGNOSIS — R68.82 DECREASED LIBIDO: ICD-10-CM

## 2024-04-04 RX ORDER — BUPROPION HYDROCHLORIDE 300 MG/1
300 TABLET ORAL DAILY
Qty: 90 TABLET | Refills: 3 | Status: SHIPPED | OUTPATIENT
Start: 2024-04-04

## 2024-04-09 ENCOUNTER — PATIENT OUTREACH (OUTPATIENT)
Dept: ADMINISTRATIVE | Facility: HOSPITAL | Age: 44
End: 2024-04-09
Payer: COMMERCIAL

## 2024-04-09 NOTE — PROGRESS NOTES
Population Health Chart Review & Patient Outreach Details      Additional Pop Health Notes:               Updates Requested / Reviewed:      Updated Care Coordination Note and External Sources: [facility] and [facility]         Health Maintenance Topics Overdue:      VB Score: 1     Cervical Cancer Screening                       Health Maintenance Topic(s) Outreach Outcomes & Actions Taken:    Cervical Cancer Screening - Outreach Outcomes & Actions Taken  : External Records Uploaded & Care Team Updated if Applicable

## 2025-02-10 ENCOUNTER — TELEPHONE (OUTPATIENT)
Dept: FAMILY MEDICINE | Facility: CLINIC | Age: 45
End: 2025-02-10
Payer: COMMERCIAL

## 2025-02-10 NOTE — TELEPHONE ENCOUNTER
Called to reschedule  patient on 2/12/25 with . Dr. Garcia will be out the clinic and we need to reschedule for a different day. A voicemail was left explaining this.

## 2025-02-11 ENCOUNTER — TELEPHONE (OUTPATIENT)
Dept: FAMILY MEDICINE | Facility: CLINIC | Age: 45
End: 2025-02-11
Payer: COMMERCIAL

## 2025-02-11 NOTE — TELEPHONE ENCOUNTER
Called to reschedule  patient on 2/12/25  with  .  will be out the clinic and we need to reschedule for a different day. A voicemail was left explaining this.

## 2025-02-28 DIAGNOSIS — Z12.31 OTHER SCREENING MAMMOGRAM: ICD-10-CM

## 2025-03-11 ENCOUNTER — PATIENT MESSAGE (OUTPATIENT)
Dept: INTERNAL MEDICINE | Facility: CLINIC | Age: 45
End: 2025-03-11
Payer: COMMERCIAL

## 2025-03-11 ENCOUNTER — LAB VISIT (OUTPATIENT)
Dept: LAB | Facility: HOSPITAL | Age: 45
End: 2025-03-11
Payer: COMMERCIAL

## 2025-03-11 ENCOUNTER — OFFICE VISIT (OUTPATIENT)
Dept: FAMILY MEDICINE | Facility: CLINIC | Age: 45
End: 2025-03-11
Payer: COMMERCIAL

## 2025-03-11 VITALS — OXYGEN SATURATION: 97 % | BODY MASS INDEX: 50.02 KG/M2 | WEIGHT: 293 LBS | HEIGHT: 64 IN | HEART RATE: 93 BPM

## 2025-03-11 DIAGNOSIS — E55.9 VITAMIN D DEFICIENCY: ICD-10-CM

## 2025-03-11 DIAGNOSIS — R79.0 LOW IRON STORES: ICD-10-CM

## 2025-03-11 DIAGNOSIS — E66.813 CLASS 3 SEVERE OBESITY WITH SERIOUS COMORBIDITY AND BODY MASS INDEX (BMI) OF 50.0 TO 59.9 IN ADULT, UNSPECIFIED OBESITY TYPE: ICD-10-CM

## 2025-03-11 DIAGNOSIS — Z12.31 ENCOUNTER FOR SCREENING MAMMOGRAM FOR MALIGNANT NEOPLASM OF BREAST: ICD-10-CM

## 2025-03-11 DIAGNOSIS — R09.A2 GLOBUS SYNDROME: ICD-10-CM

## 2025-03-11 DIAGNOSIS — Z13.1 SCREENING FOR DIABETES MELLITUS: ICD-10-CM

## 2025-03-11 DIAGNOSIS — J02.9 SORE THROAT: ICD-10-CM

## 2025-03-11 DIAGNOSIS — E66.01 CLASS 3 SEVERE OBESITY WITH SERIOUS COMORBIDITY AND BODY MASS INDEX (BMI) OF 50.0 TO 59.9 IN ADULT, UNSPECIFIED OBESITY TYPE: ICD-10-CM

## 2025-03-11 DIAGNOSIS — Z13.220 SCREENING, LIPID: ICD-10-CM

## 2025-03-11 DIAGNOSIS — E53.8 LOW SERUM VITAMIN B12: ICD-10-CM

## 2025-03-11 DIAGNOSIS — Z87.442 PERSONAL HISTORY OF KIDNEY STONES: ICD-10-CM

## 2025-03-11 DIAGNOSIS — R41.840 ATTENTION DEFICIT: Primary | ICD-10-CM

## 2025-03-11 DIAGNOSIS — E06.3 HASHIMOTO'S DISEASE: ICD-10-CM

## 2025-03-11 DIAGNOSIS — M79.631 PAIN OF RIGHT FOREARM: ICD-10-CM

## 2025-03-11 LAB
25(OH)D3+25(OH)D2 SERPL-MCNC: 36 NG/ML (ref 30–96)
ALBUMIN SERPL BCP-MCNC: 3.5 G/DL (ref 3.5–5.2)
ALP SERPL-CCNC: 109 U/L (ref 40–150)
ALT SERPL W/O P-5'-P-CCNC: 14 U/L (ref 10–44)
ANION GAP SERPL CALC-SCNC: 9 MMOL/L (ref 8–16)
AST SERPL-CCNC: 12 U/L (ref 10–40)
BILIRUB DIRECT SERPL-MCNC: 0.2 MG/DL (ref 0.1–0.3)
BILIRUB SERPL-MCNC: 0.5 MG/DL (ref 0.1–1)
BUN SERPL-MCNC: 13 MG/DL (ref 6–20)
CALCIUM SERPL-MCNC: 9.4 MG/DL (ref 8.7–10.5)
CHLORIDE SERPL-SCNC: 104 MMOL/L (ref 95–110)
CHOLEST SERPL-MCNC: 213 MG/DL (ref 120–199)
CHOLEST/HDLC SERPL: 3.4 {RATIO} (ref 2–5)
CO2 SERPL-SCNC: 23 MMOL/L (ref 23–29)
CREAT SERPL-MCNC: 0.9 MG/DL (ref 0.5–1.4)
EST. GFR  (NO RACE VARIABLE): >60 ML/MIN/1.73 M^2
ESTIMATED AVG GLUCOSE: 105 MG/DL (ref 68–131)
FERRITIN SERPL-MCNC: 43 NG/ML (ref 20–300)
GLUCOSE SERPL-MCNC: 114 MG/DL (ref 70–110)
HBA1C MFR BLD: 5.3 % (ref 4–5.6)
HDLC SERPL-MCNC: 63 MG/DL (ref 40–75)
HDLC SERPL: 29.6 % (ref 20–50)
IRON SERPL-MCNC: 48 UG/DL (ref 30–160)
LDLC SERPL CALC-MCNC: 135.8 MG/DL (ref 63–159)
NONHDLC SERPL-MCNC: 150 MG/DL
POTASSIUM SERPL-SCNC: 4.2 MMOL/L (ref 3.5–5.1)
PROT SERPL-MCNC: 7.9 G/DL (ref 6–8.4)
SATURATED IRON: 10 % (ref 20–50)
SODIUM SERPL-SCNC: 136 MMOL/L (ref 136–145)
T3FREE SERPL-MCNC: 2.9 PG/ML (ref 2.3–4.2)
T4 FREE SERPL-MCNC: 1 NG/DL (ref 0.71–1.51)
TOTAL IRON BINDING CAPACITY: 474 UG/DL (ref 250–450)
TRANSFERRIN SERPL-MCNC: 320 MG/DL (ref 200–375)
TRIGL SERPL-MCNC: 71 MG/DL (ref 30–150)
TSH SERPL DL<=0.005 MIU/L-ACNC: 4.28 UIU/ML (ref 0.4–4)
VIT B12 SERPL-MCNC: 412 PG/ML (ref 210–950)

## 2025-03-11 PROCEDURE — 80076 HEPATIC FUNCTION PANEL: CPT | Performed by: FAMILY MEDICINE

## 2025-03-11 PROCEDURE — 80061 LIPID PANEL: CPT | Performed by: FAMILY MEDICINE

## 2025-03-11 PROCEDURE — 3008F BODY MASS INDEX DOCD: CPT | Mod: S$GLB,,, | Performed by: FAMILY MEDICINE

## 2025-03-11 PROCEDURE — 3044F HG A1C LEVEL LT 7.0%: CPT | Mod: S$GLB,,, | Performed by: FAMILY MEDICINE

## 2025-03-11 PROCEDURE — 1160F RVW MEDS BY RX/DR IN RCRD: CPT | Mod: S$GLB,,, | Performed by: FAMILY MEDICINE

## 2025-03-11 PROCEDURE — 84439 ASSAY OF FREE THYROXINE: CPT | Performed by: FAMILY MEDICINE

## 2025-03-11 PROCEDURE — 82728 ASSAY OF FERRITIN: CPT | Performed by: FAMILY MEDICINE

## 2025-03-11 PROCEDURE — 1159F MED LIST DOCD IN RCRD: CPT | Mod: S$GLB,,, | Performed by: FAMILY MEDICINE

## 2025-03-11 PROCEDURE — 80048 BASIC METABOLIC PNL TOTAL CA: CPT | Performed by: FAMILY MEDICINE

## 2025-03-11 PROCEDURE — 84481 FREE ASSAY (FT-3): CPT | Performed by: FAMILY MEDICINE

## 2025-03-11 PROCEDURE — 83036 HEMOGLOBIN GLYCOSYLATED A1C: CPT | Performed by: FAMILY MEDICINE

## 2025-03-11 PROCEDURE — G2211 COMPLEX E/M VISIT ADD ON: HCPCS | Mod: S$GLB,,, | Performed by: FAMILY MEDICINE

## 2025-03-11 PROCEDURE — 99999 PR PBB SHADOW E&M-EST. PATIENT-LVL IV: CPT | Mod: PBBFAC,,, | Performed by: FAMILY MEDICINE

## 2025-03-11 PROCEDURE — 82607 VITAMIN B-12: CPT | Performed by: FAMILY MEDICINE

## 2025-03-11 PROCEDURE — 82306 VITAMIN D 25 HYDROXY: CPT | Performed by: FAMILY MEDICINE

## 2025-03-11 PROCEDURE — 99215 OFFICE O/P EST HI 40 MIN: CPT | Mod: S$GLB,,, | Performed by: FAMILY MEDICINE

## 2025-03-11 PROCEDURE — 84443 ASSAY THYROID STIM HORMONE: CPT | Performed by: FAMILY MEDICINE

## 2025-03-11 PROCEDURE — 84466 ASSAY OF TRANSFERRIN: CPT | Performed by: FAMILY MEDICINE

## 2025-03-11 RX ORDER — ASPIRIN 325 MG
50000 TABLET, DELAYED RELEASE (ENTERIC COATED) ORAL
Qty: 24 CAPSULE | Refills: 0 | Status: SHIPPED | OUTPATIENT
Start: 2025-03-13

## 2025-03-11 RX ORDER — ASPIRIN 325 MG
TABLET, DELAYED RELEASE (ENTERIC COATED) ORAL
Qty: 48 CAPSULE | Refills: 0 | OUTPATIENT
Start: 2025-03-11

## 2025-03-11 NOTE — PROGRESS NOTES
"Subjective:       Patient ID: Dyan Wiseman is a 44 y.o. female.    Chief Complaint: Health Maintenance    HPI    History of Present Illness    CHIEF COMPLAINT:  Patient presents today for right arm pain and multiple concerns    RIGHT UPPER EXTREMITY PAIN:  She reports right arm pain radiating to her shoulder, with particular intensity in the forearm area accompanied by occasional heat sensation. Pain worsens with prolonged driving, walking, or when arm is in dependent position, and improves when lying down. She experiences whole hand aching requiring her to shake it out for relief. 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    ENT SYMPTOMS:  She reports constant phlegm production with frequent swallowing and sensation of something being stuck in her throat, similar to a lodged bone. She denies sore throat.    GYNECOLOGIC:  She reports irregular menstrual periods with varying flow intensity and occasional large blood clots. She experiences intense hot flashes associated with perimenopause, which induce anxiety and create a sensation of "breathing fire."    HYDRATION STATUS:  She reports significant aversion to water consumption due to nausea and metallic taste. Primary fluid intake consists of caffeinated beverages, mainly cola. She notes dark to orange-colored urine and acknowledges going 8-12 hours during workday without fluid intake. She has attempted using Crystal Light and lemon additives to improve water palatability.    MENTAL HEALTH:  She discontinued Wellbutrin due to ineffectiveness. 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    DIET:  She typically consumes one meal per day around dinner time. She reports multiple unsuccessful weight loss attempts since age 12, including Atkins diet, Weight " "Watchers, and carnivore diet.      ROS:  General: -fever, -chills, -fatigue, -weight gain, -weight loss  Eyes: -vision changes, -redness, -discharge  ENT: -ear pain, -nasal congestion, -sore throat  Cardiovascular: -chest pain, -palpitations, -lower extremity edema  Respiratory: -cough, -shortness of breath  Gastrointestinal: -abdominal pain, +nausea, -vomiting, -diarrhea, -constipation, -blood in stool  Genitourinary: -dysuria, -hematuria, -frequency  Musculoskeletal: +joint pain, +muscle pain  Skin: -rash, -lesion  Neurological: -headache, -dizziness, -numbness, -tingling  Psychiatric: +anxiety, -depression, -sleep difficulty         Review of Systems   All other systems reviewed and are negative.        Reviewed family, medical, surgical, and social history.    Objective:      Physical Exam    General: No acute distress. Well-developed. Well-nourished.  Eyes: EOMI. Sclerae anicteric.  HENT: Normocephalic. Atraumatic. Nares patent. Moist oral mucosa. Post nasal drip.  Ears: Bilateral TMs clear. Bilateral EACs clear.  Cardiovascular: Regular rate. Regular rhythm. No murmurs. No rubs. No gallops. Normal S1, S2.  Respiratory: Normal respiratory effort. Clear to auscultation bilaterally. No rales. No rhonchi. No wheezing.  Abdomen: Soft. Non-tender. Non-distended. Normoactive bowel sounds.  Musculoskeletal: No  obvious deformity.  Extremities: No lower extremity edema.  Neurological: Alert & oriented x3. No slurred speech. Normal gait. Sensation intact in fingertips.  Psychiatric: Normal mood. Normal affect. Good insight. Good judgment.  Skin: Warm. Dry. No rash.  MSK: Elbow - Right: Right brachioradialis tenderness.       Pulse 93   Ht 5' 4" (1.626 m)   Wt (!) 137.8 kg (303 lb 11.2 oz)   LMP 02/24/2025 (Approximate)   SpO2 97%   BMI 52.13 kg/m²   Physical Exam    Assessment:       1. Attention deficit    2. Vitamin D deficiency    3. Globus syndrome    4. Pain of right forearm    5. Low iron stores    6. Low " serum vitamin B12    7. Sore throat    8. Hashimoto's disease    9. Class 3 severe obesity with serious comorbidity and body mass index (BMI) of 50.0 to 59.9 in adult, unspecified obesity type    10. Personal history of kidney stones    11. Screening, lipid    12. Screening for diabetes mellitus    13. Encounter for screening mammogram for malignant neoplasm of breast        Plan:       Assessment & Plan    IMPRESSION:  - Assessed right arm pain, likely tendonitis related to repetitive mouse use  - Considering thyroid dysfunction as potential cause of persistent symptoms, including constant phlegm and swallowing difficulties  - Evaluating for potential ADHD, noting need for formal assessment to differentiate from other conditions like depression or anxiety  - Addressed perimenopause symptoms, including hot flashes  - Considered weight management options, noting patient's history with various diets and reluctance towards bariatric surgery    ADHD:  - Explained link between ADHD symptoms and overeating in females.  - Referred for ADHD assessment.    WEIGHT MANAGEMENT:  - Discussed importance of proper hydration for overall health and weight management.  - Educated on potential side effects of bariatric surgery, including hair loss and potential for weight regain.  - Informed about Zepbound as a potential weight loss treatment option.  - Improve hydration habits, considering alternatives like sugar-free flavoring or diet sodas.  - Set reminders to drink fluids regularly throughout the day.  - Check with insurance regarding coverage for obesity treatment.    ERGONOMIC RECOMMENDATIONS:  - Use ergonomic vertical mouse to reduce arm strain.    MEDICATIONS/SUPPLEMENTS:  - Started ibuprofen 200mg for 10 days, to be taken with food.  - Refilled vitamin D prescription.  - Started OTC Levo Cetirizine (generic Xyzal), half tablet daily, for post-nasal drip.    LABS:  - Comprehensive lab panel ordered including thyroid function,  liver, kidney, A1C, iron, iron stores, B12, vitamin D.    THYROID DISORDER:  - Thyroid ultrasound ordered.    MAMMOGRAM:  - Mammogram ordered.  - Patient to schedule through [facility].    FOLLOW UP:  - Follow up in 3 months to review thyroid function, obesity treatment options, and overall progress.         1. Attention deficit  Discussed referral for adhd evaluation--[facility]? She will check with her insurance to see who is covered on her plan, then we will send referral accordingaly.    2. Vitamin D deficiency  Restart D3  -     cholecalciferol, vitamin D3, (DECARA) 1,250 mcg (50,000 unit) capsule; Take 1 capsule (50,000 Units total) by mouth twice a week. With a meal or with a spoonful of peanut butter for low vitamin D.  Dispense: 24 capsule; Refill: 0    3. Globus syndrome  -     US Soft Tissue Head Neck; Future; Expected date: 03/11/2025    4. Pain of right forearm  Likely due to use of mouse all day at the computer. See above.  -     X-Ray Cervical Spine AP And Lateral; Future; Expected date: 03/11/2025  -     X-Ray Elbow Complete Right; Future; Expected date: 03/11/2025    5. Low iron stores  RIKY Ferrasorb daily  -     Iron and TIBC; Future; Expected date: 03/11/2025  -     Ferritin; Future; Expected date: 03/11/2025    6. Low serum vitamin B12  -     Vitamin B12; Future; Expected date: 03/11/2025    7. Sore throat  Saltwater gargles. Consider mild nondrowsy antihistamine such as Claritin.    8. Hashimoto's disease  -     US Soft Tissue Head Neck; Future; Expected date: 03/11/2025    9. Class 3 severe obesity with serious comorbidity and body mass index (BMI) of 50.0 to 59.9 in adult, unspecified obesity type  Recommend a diet low in salt and sugar, increased physical activity, and avoidance of processed foods.  -     TSH; Future; Expected date: 03/11/2025  -     T4, Free; Future; Expected date: 03/11/2025  -     T3, Free; Future; Expected date: 03/11/2025  -     Vitamin D; Future; Expected date:  "03/11/2025  -     Hemoglobin A1C; Future; Expected date: 03/11/2025  -     Hepatic Function Panel; Future; Expected date: 03/11/2025  -     Basic Metabolic Panel; Future; Expected date: 03/11/2025  -     Lipid Panel; Future; Expected date: 03/11/2025    10. Personal history of kidney stones  Continue to monitor  Overview:  "all kinds of stones" total of 9 times she was seen in the ER for kidney stones      11. Screening, lipid  -     Lipid Panel; Future; Expected date: 03/11/2025    12. Screening for diabetes mellitus  -     Hemoglobin A1C; Future; Expected date: 03/11/2025    13. Encounter for screening mammogram for malignant neoplasm of breast  -     Mammo Digital Screening Bilat w/ Adolph (XPD); Future; Expected date: 03/11/2025              Strict return precautions reviewed and patient verbalized understanding. Risks, benefits, and alternatives to the plan were reviewed in detail and all questions answered to the patient's satisfaction. All questions were answered to the fullest satisfaction of the patient, and patient verbalized understanding and agreement to treatment plan. Patient agreed to call with any new or worsening symptoms, or present to the ER.     40 minutes total were spent on today's visit, not limited to but including time based on counseling and coordination of care.      This note was generated with the assistance of ambient listening technology. Verbal consent was obtained by the patient and accompanying visitor(s) for the recording of patient appointment to facilitate this note. I attest to having reviewed and edited the generated note for accuracy, though some syntax or spelling errors may persist. Please contact the author of this note for any clarification.        Follow up in about 3 months (around 6/11/2025) for follow up obesity, thyroid.    "

## 2025-03-11 NOTE — TELEPHONE ENCOUNTER
Care Due:                  Date            Visit Type   Department     Provider  --------------------------------------------------------------------------------                                EP -                              PRIMARY      [facility]  Last Visit: 03-      CARE ([facility])   MEDICINE       Mary Morales  Next Visit: None Scheduled  None         None Found                                                            Last  Test          Frequency    Reason                     Performed    Due Date  --------------------------------------------------------------------------------    Ca..........  12 months..  cholecalciferol,.........  12- 12-    Vitamin D...  12 months..  cholecalciferol,.........  12- 12-    [employer] Embedded Care Due Messages. Reference number: [id number].   3/11/2025 11:03:19 AM CDT

## 2025-03-19 NOTE — PATIENT INSTRUCTIONS
Luther Zaidi,     If you are due for any health screening(s) below please notify me so we can arrange them to be ordered and scheduled. Most healthy patients at your age complete them, but you are free to accept or refuse.     If you can't do it, I'll definitely understand. If you can, I'd certainly appreciate it!    Tests to Keep You Healthy    Mammogram: ORDERED BUT NOT SCHEDULED  Cervical Cancer Screening: Met on 5/24/2023      Schedule your breast cancer screening today     Breast cancer is the second most common cancer in women,  and the second leading cause of death from cancer. Mammograms can detect breast cancer early, which significantly increases the chances of curing the cancer.       Our records indicate that you may be overdue for breast cancer screening. Cancer screenings save lives, so schedule yours today to stay healthy.     If you recently had a mammogram performed outside of [facility], please let your Health care team know so that they can update your health record.

## 2025-03-21 ENCOUNTER — HOSPITAL ENCOUNTER (OUTPATIENT)
Dept: RADIOLOGY | Facility: HOSPITAL | Age: 45
Discharge: HOME | End: 2025-03-21
Attending: FAMILY MEDICINE
Payer: COMMERCIAL

## 2025-03-21 DIAGNOSIS — R09.A2 GLOBUS SYNDROME: ICD-10-CM

## 2025-03-21 DIAGNOSIS — E06.3 HASHIMOTO'S DISEASE: ICD-10-CM

## 2025-03-21 PROCEDURE — 76536 US EXAM OF HEAD AND NECK: CPT | Mod: 26,,, | Performed by: RADIOLOGY

## 2025-03-21 PROCEDURE — 76536 US EXAM OF HEAD AND NECK: CPT | Mod: TC

## 2025-03-30 ENCOUNTER — PATIENT MESSAGE (OUTPATIENT)
Dept: INTERNAL MEDICINE | Facility: CLINIC | Age: 45
End: 2025-03-30
Payer: COMMERCIAL

## 2025-04-17 ENCOUNTER — OFFICE VISIT (OUTPATIENT)
Dept: FAMILY MEDICINE | Facility: CLINIC | Age: 45
End: 2025-04-17
Payer: COMMERCIAL

## 2025-04-17 DIAGNOSIS — J20.9 ACUTE BRONCHITIS, UNSPECIFIED ORGANISM: Primary | ICD-10-CM

## 2025-04-17 DIAGNOSIS — Z78.9 NONSMOKER: ICD-10-CM

## 2025-04-17 PROCEDURE — 98004 SYNCH AUDIO-VIDEO EST SF 10: CPT | Mod: 95,,, | Performed by: FAMILY MEDICINE

## 2025-04-17 PROCEDURE — 3044F HG A1C LEVEL LT 7.0%: CPT | Mod: 95,,, | Performed by: FAMILY MEDICINE

## 2025-04-17 RX ORDER — AZITHROMYCIN 250 MG/1
TABLET, FILM COATED ORAL
Qty: 6 TABLET | Refills: 0 | Status: SHIPPED | OUTPATIENT
Start: 2025-04-17

## 2025-04-17 NOTE — PROGRESS NOTES
The patient location is: [place]   The chief complaint leading to consultation is: chest cold    Visit type: audiovisual    Face to Face time with patient:  5 mins  10 minutes of total time spent on the encounter, which includes face to face time and non-face to face time preparing to see the patient (eg, review of tests), Obtaining and/or reviewing separately obtained history, Documenting clinical information in the electronic or other health record, Independently interpreting results (not separately reported) and communicating results to the patient/family/caregiver, or Care coordination (not separately reported).         Each patient to whom he or she provides medical services by telemedicine is:  (1) informed of the relationship between the physician and patient and the respective role of any other health care provider with respect to management of the patient; and (2) notified that he or she may decline to receive medical services by telemedicine and may withdraw from such care at any time.    Notes:   3 day history of chest cold, sinus congestion and coughing and wheezing. States  was sick took a z-pack last week. Subjective fever.    Answers submitted by the patient for this visit:  Cough Questionnaire (Submitted on 4/17/2025)  Chief Complaint: Cough  Chronicity: new  Onset: in the past 7 days  Progression since onset: rapidly worsening  Frequency: constantly  Cough characteristics: productive of sputum  chest pain: Yes  chills: No  ear congestion: Yes  ear pain: Yes  fever: Yes  headaches: Yes  heartburn: No  hemoptysis: No  myalgias: Yes  nasal congestion: Yes  postnasal drip: Yes  rash: No  rhinorrhea: Yes  shortness of breath: No  sore throat: Yes  sweats: No  weight loss: No  wheezing: Yes  Aggravated by: nothing  asthma: No  bronchiectasis: No  bronchitis: No  COPD: No  emphysema: No  environmental allergies: No  pneumonia: No  Treatments tried: OTC cough suppressant  Improvement on  treatment: no relief    Physical Exam  Constitutional:       General: She is not in acute distress.     Appearance: She is ill-appearing. She is not toxic-appearing.   HENT:      Nose: Congestion present.   Pulmonary:      Effort: No respiratory distress.      Comments: Coughing during visit  Musculoskeletal:         General: Normal range of motion.   Neurological:      General: No focal deficit present.      Mental Status: She is alert. Mental status is at baseline.   Psychiatric:         Mood and Affect: Mood normal.         Behavior: Behavior normal.        Assessment and plan  We will treat for bronchitis with Z-Shon advised to continue over-the-counter cough cold medications.  Risks, benefits, and side effects were discussed with the patient. All questions were answered to the fullest satisfaction of the patient, and pt verbalized understanding and agreement to treatment plan. Pt was to call her pcp with any new or worsening symptoms, or present to the ER.         Birgit Galvez MD  Family Medicine Physician   [facility]     This note was created using M*Modal voice recognition software that occasionally may misinterpret phrases or words.

## 2025-06-03 DIAGNOSIS — E55.9 VITAMIN D DEFICIENCY: ICD-10-CM

## 2025-06-03 RX ORDER — ASPIRIN 325 MG
TABLET, DELAYED RELEASE (ENTERIC COATED) ORAL
Qty: 24 CAPSULE | Refills: 0 | Status: SHIPPED | OUTPATIENT
Start: 2025-06-03

## 2025-07-01 ENCOUNTER — E-VISIT (OUTPATIENT)
Dept: FAMILY MEDICINE | Facility: CLINIC | Age: 45
End: 2025-07-01
Payer: COMMERCIAL

## 2025-07-01 DIAGNOSIS — B00.1 COLD SORE: Primary | ICD-10-CM

## 2025-07-01 DIAGNOSIS — L01.00 IMPETIGO: ICD-10-CM

## 2025-07-01 RX ORDER — FLUCONAZOLE 150 MG/1
150 TABLET ORAL
Qty: 3 TABLET | Refills: 0 | Status: SHIPPED | OUTPATIENT
Start: 2025-07-01

## 2025-07-01 RX ORDER — SULFAMETHOXAZOLE AND TRIMETHOPRIM 800; 160 MG/1; MG/1
1 TABLET ORAL 2 TIMES DAILY
Qty: 14 TABLET | Refills: 0 | Status: SHIPPED | OUTPATIENT
Start: 2025-07-01 | End: 2025-07-08

## 2025-07-01 RX ORDER — VALACYCLOVIR HYDROCHLORIDE 1 G/1
TABLET, FILM COATED ORAL
Qty: 60 TABLET | Refills: 0 | Status: SHIPPED | OUTPATIENT
Start: 2025-07-01

## 2025-07-01 NOTE — PROGRESS NOTES
Patient ID: Dyan Wiseman is a 44 y.o. female.        E-Visit Time Tracking:   Day 1 Time (in minutes): 10  Total Time (in minutes): 10      Chief Complaint: General Illness (Entered automatically based on patient selection in [employer].)      The patient initiated a request through [employer] on 7/1/2025 for evaluation and management with a chief complaint of General Illness (Entered automatically based on patient selection in [employer].)     I evaluated the questionnaire submission on 07/01/2025.    [facility] E[facility]-Common Problems    7/1/2025  3:41 PM CDT - Filed by Patient   What do you need help with? Other Concern   Do you agree to participate in an E-Visit? Yes   If you have any of the following symptoms, please go to the nearest emergency room or call 911: I acknowledge   Do you have any of the following pregnancy-related conditions? (Pregnant, Possibly pregnant, Breast feeding, None) None   What is the main issue you would like addressed today? Impetigo   Please describe your symptoms. Cold sore like swollen red with honey colored scab   Where is your problem located? Side of mouth   On a scale of 1-10, where 10 is the worst you can imagine, how severe are your symptoms? (range: 1 - 10) 2   Have you had these symptoms before? Yes   How long have you been having these symptoms? (Just today, For a few days, For a week, For one to four weeks, For more than a month) A few days   What helps with your symptoms? Mupirocin   What makes your symptoms feel worse? Nothing   Are these symptoms related to a condition that you currently have? (Yes, No, Not sure) No   Please describe any probable cause for your symptoms. Bacteria   Provide any information you feel is important to your history not asked above    Please attach any relevant images or files    Are you able to take your vitals? No         Encounter Diagnoses   Name Primary?    Cold sore Yes    Impetigo         No orders of the defined types were placed in  this encounter.     Medications Ordered This Encounter   Medications    fluconazole (DIFLUCAN) 150 MG Tab     Sig: Take 1 tablet (150 mg total) by mouth every 72 hours as needed (yeast after antibiotic).     Dispense:  3 tablet     Refill:  0    sulfamethoxazole-trimethoprim 800-160mg (BACTRIM DS) 800-160 mg Tab     Sig: Take 1 tablet by mouth 2 (two) times daily. for 7 days     Dispense:  14 tablet     Refill:  0    valACYclovir (VALTREX) 1000 MG tablet     Sig: Take 2 tablets 2 times daily for 2 days, then take 1 tablet twice daily for up to a week for cold sore.     Dispense:  60 tablet     Refill:  0        Follow up if symptoms worsen or fail to improve.